# Patient Record
Sex: FEMALE | Race: OTHER | HISPANIC OR LATINO | URBAN - METROPOLITAN AREA
[De-identification: names, ages, dates, MRNs, and addresses within clinical notes are randomized per-mention and may not be internally consistent; named-entity substitution may affect disease eponyms.]

---

## 2019-07-24 ENCOUNTER — EMERGENCY (EMERGENCY)
Facility: HOSPITAL | Age: 39
LOS: 1 days | Discharge: HOME | End: 2019-07-24
Attending: EMERGENCY MEDICINE
Payer: MEDICAID

## 2019-07-24 VITALS
OXYGEN SATURATION: 100 % | RESPIRATION RATE: 16 BRPM | TEMPERATURE: 98 F | SYSTOLIC BLOOD PRESSURE: 108 MMHG | DIASTOLIC BLOOD PRESSURE: 74 MMHG | HEART RATE: 72 BPM

## 2019-07-24 VITALS
WEIGHT: 139.99 LBS | TEMPERATURE: 98 F | RESPIRATION RATE: 18 BRPM | OXYGEN SATURATION: 100 % | DIASTOLIC BLOOD PRESSURE: 73 MMHG | HEIGHT: 63 IN | SYSTOLIC BLOOD PRESSURE: 113 MMHG | HEART RATE: 76 BPM

## 2019-07-24 LAB
ALBUMIN SERPL ELPH-MCNC: 4.1 G/DL — SIGNIFICANT CHANGE UP (ref 3.3–5)
ALP SERPL-CCNC: 56 U/L — SIGNIFICANT CHANGE UP (ref 40–120)
ALT FLD-CCNC: 16 U/L — SIGNIFICANT CHANGE UP (ref 10–45)
ANION GAP SERPL CALC-SCNC: 13 MMOL/L — SIGNIFICANT CHANGE UP (ref 5–17)
AST SERPL-CCNC: 12 U/L — SIGNIFICANT CHANGE UP (ref 10–40)
BASOPHILS # BLD AUTO: 0 K/UL — SIGNIFICANT CHANGE UP (ref 0–0.2)
BASOPHILS NFR BLD AUTO: 0.4 % — SIGNIFICANT CHANGE UP (ref 0–2)
BILIRUB SERPL-MCNC: 0.4 MG/DL — SIGNIFICANT CHANGE UP (ref 0.2–1.2)
BUN SERPL-MCNC: 7 MG/DL — SIGNIFICANT CHANGE UP (ref 7–23)
CALCIUM SERPL-MCNC: 9.2 MG/DL — SIGNIFICANT CHANGE UP (ref 8.4–10.5)
CHLORIDE SERPL-SCNC: 101 MMOL/L — SIGNIFICANT CHANGE UP (ref 96–108)
CO2 SERPL-SCNC: 24 MMOL/L — SIGNIFICANT CHANGE UP (ref 22–31)
CREAT SERPL-MCNC: 0.57 MG/DL — SIGNIFICANT CHANGE UP (ref 0.5–1.3)
EOSINOPHIL # BLD AUTO: 0.1 K/UL — SIGNIFICANT CHANGE UP (ref 0–0.5)
EOSINOPHIL NFR BLD AUTO: 1.5 % — SIGNIFICANT CHANGE UP (ref 0–6)
GLUCOSE SERPL-MCNC: 200 MG/DL — HIGH (ref 70–99)
HCT VFR BLD CALC: 38.6 % — SIGNIFICANT CHANGE UP (ref 34.5–45)
HGB BLD-MCNC: 14.3 G/DL — SIGNIFICANT CHANGE UP (ref 11.5–15.5)
LYMPHOCYTES # BLD AUTO: 3.4 K/UL — HIGH (ref 1–3.3)
LYMPHOCYTES # BLD AUTO: 39.1 % — SIGNIFICANT CHANGE UP (ref 13–44)
MCHC RBC-ENTMCNC: 33.8 PG — SIGNIFICANT CHANGE UP (ref 27–34)
MCHC RBC-ENTMCNC: 37 GM/DL — HIGH (ref 32–36)
MCV RBC AUTO: 91.4 FL — SIGNIFICANT CHANGE UP (ref 80–100)
MONOCYTES # BLD AUTO: 0.5 K/UL — SIGNIFICANT CHANGE UP (ref 0–0.9)
MONOCYTES NFR BLD AUTO: 5.6 % — SIGNIFICANT CHANGE UP (ref 2–14)
NEUTROPHILS # BLD AUTO: 4.7 K/UL — SIGNIFICANT CHANGE UP (ref 1.8–7.4)
NEUTROPHILS NFR BLD AUTO: 53.5 % — SIGNIFICANT CHANGE UP (ref 43–77)
PLATELET # BLD AUTO: 314 K/UL — SIGNIFICANT CHANGE UP (ref 150–400)
POTASSIUM SERPL-MCNC: 3.8 MMOL/L — SIGNIFICANT CHANGE UP (ref 3.5–5.3)
POTASSIUM SERPL-SCNC: 3.8 MMOL/L — SIGNIFICANT CHANGE UP (ref 3.5–5.3)
PROT SERPL-MCNC: 6.8 G/DL — SIGNIFICANT CHANGE UP (ref 6–8.3)
RBC # BLD: 4.22 M/UL — SIGNIFICANT CHANGE UP (ref 3.8–5.2)
RBC # FLD: 10.8 % — SIGNIFICANT CHANGE UP (ref 10.3–14.5)
SODIUM SERPL-SCNC: 138 MMOL/L — SIGNIFICANT CHANGE UP (ref 135–145)
WBC # BLD: 8.7 K/UL — SIGNIFICANT CHANGE UP (ref 3.8–10.5)
WBC # FLD AUTO: 8.7 K/UL — SIGNIFICANT CHANGE UP (ref 3.8–10.5)

## 2019-07-24 PROCEDURE — 93005 ELECTROCARDIOGRAM TRACING: CPT

## 2019-07-24 PROCEDURE — 93010 ELECTROCARDIOGRAM REPORT: CPT

## 2019-07-24 PROCEDURE — 99284 EMERGENCY DEPT VISIT MOD MDM: CPT | Mod: 25

## 2019-07-24 PROCEDURE — 85027 COMPLETE CBC AUTOMATED: CPT

## 2019-07-24 PROCEDURE — 99283 EMERGENCY DEPT VISIT LOW MDM: CPT

## 2019-07-24 PROCEDURE — 80053 COMPREHEN METABOLIC PANEL: CPT

## 2019-07-24 NOTE — ED ADULT NURSE NOTE - NSIMPLEMENTINTERV_GEN_ALL_ED
Implemented All Universal Safety Interventions:  [place] to call system. Call bell, personal items and telephone within reach. Instruct patient to call for assistance. Room bathroom lighting operational. Non-slip footwear when patient is off stretcher. Physically safe environment: no spills, clutter or unnecessary equipment. Stretcher in lowest position, wheels locked, appropriate side rails in place.

## 2019-07-24 NOTE — ED PROVIDER NOTE - PHYSICAL EXAMINATION
GENERAL: no acute distress; well-developed  HEAD:  Atraumatic, Normocephalic  EYES: EOMI, PERRLA, conjunctiva and sclera clear  ENT: MMM; oropharynx clear  NECK: Supple, No JVD  CHEST/LUNG: Clear to auscultation bilaterally; No wheeze  HEART: Regular rate and rhythm; No murmurs, rubs, or gallops  ABDOMEN: Soft, Nontender, Nondistended; Bowel sounds present  EXTREMITIES:  2+ Peripheral Pulses, No clubbing, cyanosis, or edema  PSYCH: AAOx3  NEUROLOGY: no focal motor or sensory deficits. 5/5 muscle strength in all extremities. CN II-XII grossly intact. Negative Pronator Drift. Normal finger to nose. Normal Gait.  SKIN: No rashes or lesions

## 2019-07-24 NOTE — ED ADULT NURSE NOTE - OBJECTIVE STATEMENT
Patient is a 39 year old female complaining of dizziness. Arrived by walk-in. Patient has no pmhx. Patient is A&O x 4 and appears well. Pt reports she was visiting her  who is post op and felt warm, dizzy and nauseated. States she has not eaten or drank much today. Denies complaints of chest pain, sob, fevers, chills, v/d, headache, syncope, burning urination, blood in urine, blood in stool. Abd is soft, non tender, non distended. Skin is warm and dry. Color is consistent with ethnicity. VSS/ NAD. Safety and comfort maintained. Family at the bedside. Will continue to monitor.

## 2019-07-24 NOTE — ED PROVIDER NOTE - CLINICAL SUMMARY MEDICAL DECISION MAKING FREE TEXT BOX
38 y/o F no sig PMH presents with dizziness.  Likely stress reaction; no focal neurological deficits, palpitations or syncope.  Check EKG, r/o electrolyte abn, IVF, reassess. 38 y/o F no sig PMH presents with dizziness.  Likely stress reaction; no focal neurological deficits, palpitations or syncope.  Check EKG, r/o electrolyte abn, anemia, check pregnancy screen, IVF, reassess. 40 y/o F no sig PMH presents with dizziness/prensyncopal episode.   Likely stress reaction; no focal neurological deficits, palpitations or syncope.  Check EKG, r/o electrolyte abn, anemia, check pregnancy screen, IVF, reassess. Dr. Modi (Attending Physician)  40 y/o F no sig PMH presents with dizziness/prensyncopal episode.   Likely stress reaction; no focal neurological deficits, palpitations or syncope.  Check EKG, r/o electrolyte abn, anemia, check pregnancy screen, IVF, reassess.

## 2019-07-24 NOTE — ED PROVIDER NOTE - OBJECTIVE STATEMENT
38 y/o F no sig PMH presents with dizziness.    Patient was visiting her  who just underwent neurosurgery and upon seeing him, felt woozy with nausea. No syncope. No vomiting, headache, vision changes or palpitations. Symptoms improved in ED. No fevers/chills, melena, hematochezia or menorrhagia. 38 y/o F no sig PMH presents with dizziness.    Patient was visiting her  who just underwent neurosurgery and upon seeing him, felt warm, and light-headed with nausea. No syncope. No vomiting, headache, vision changes or palpitations. Symptoms improved in ED. No fevers/chills, melena, hematochezia or menorrhagia.

## 2019-07-24 NOTE — ED PROVIDER NOTE - ATTENDING CONTRIBUTION TO CARE
Dr. Modi (Attending Physician)  I performed a history and physical exam of the patient and discussed their management with the resident. I reviewed the resident's note and agree with the documented findings and plan of care. My medical decision making and observations are found above.

## 2020-10-21 ENCOUNTER — EMERGENCY (EMERGENCY)
Facility: HOSPITAL | Age: 40
LOS: 1 days | Discharge: HOME | End: 2020-10-21
Attending: EMERGENCY MEDICINE
Payer: COMMERCIAL

## 2020-10-21 VITALS
SYSTOLIC BLOOD PRESSURE: 118 MMHG | DIASTOLIC BLOOD PRESSURE: 74 MMHG | HEART RATE: 96 BPM | RESPIRATION RATE: 18 BRPM | TEMPERATURE: 99 F | HEIGHT: 60 IN | WEIGHT: 149.91 LBS | OXYGEN SATURATION: 100 %

## 2020-10-21 LAB
ALBUMIN SERPL ELPH-MCNC: 4.1 G/DL — SIGNIFICANT CHANGE UP (ref 3.3–5.2)
ALP SERPL-CCNC: 51 U/L — SIGNIFICANT CHANGE UP (ref 40–120)
ALT FLD-CCNC: 15 U/L — SIGNIFICANT CHANGE UP
ANION GAP SERPL CALC-SCNC: 9 MMOL/L — SIGNIFICANT CHANGE UP (ref 5–17)
APPEARANCE UR: CLEAR — SIGNIFICANT CHANGE UP
AST SERPL-CCNC: 14 U/L — SIGNIFICANT CHANGE UP
BASOPHILS # BLD AUTO: 0.02 K/UL — SIGNIFICANT CHANGE UP (ref 0–0.2)
BASOPHILS NFR BLD AUTO: 0.3 % — SIGNIFICANT CHANGE UP (ref 0–2)
BILIRUB SERPL-MCNC: 0.3 MG/DL — LOW (ref 0.4–2)
BILIRUB UR-MCNC: NEGATIVE — SIGNIFICANT CHANGE UP
BUN SERPL-MCNC: 7 MG/DL — LOW (ref 8–20)
CALCIUM SERPL-MCNC: 8.9 MG/DL — SIGNIFICANT CHANGE UP (ref 8.6–10.2)
CHLORIDE SERPL-SCNC: 103 MMOL/L — SIGNIFICANT CHANGE UP (ref 98–107)
CO2 SERPL-SCNC: 26 MMOL/L — SIGNIFICANT CHANGE UP (ref 22–29)
COLOR SPEC: YELLOW — SIGNIFICANT CHANGE UP
CREAT SERPL-MCNC: 0.55 MG/DL — SIGNIFICANT CHANGE UP (ref 0.5–1.3)
DIFF PNL FLD: ABNORMAL
EOSINOPHIL # BLD AUTO: 0.24 K/UL — SIGNIFICANT CHANGE UP (ref 0–0.5)
EOSINOPHIL NFR BLD AUTO: 3.5 % — SIGNIFICANT CHANGE UP (ref 0–6)
EPI CELLS # UR: SIGNIFICANT CHANGE UP
GLUCOSE SERPL-MCNC: 130 MG/DL — HIGH (ref 70–99)
GLUCOSE UR QL: NEGATIVE MG/DL — SIGNIFICANT CHANGE UP
HCT VFR BLD CALC: 38.8 % — SIGNIFICANT CHANGE UP (ref 34.5–45)
HGB BLD-MCNC: 13.3 G/DL — SIGNIFICANT CHANGE UP (ref 11.5–15.5)
IMM GRANULOCYTES NFR BLD AUTO: 0.1 % — SIGNIFICANT CHANGE UP (ref 0–1.5)
KETONES UR-MCNC: NEGATIVE — SIGNIFICANT CHANGE UP
LEUKOCYTE ESTERASE UR-ACNC: NEGATIVE — SIGNIFICANT CHANGE UP
LIDOCAIN IGE QN: 23 U/L — SIGNIFICANT CHANGE UP (ref 22–51)
LYMPHOCYTES # BLD AUTO: 2.56 K/UL — SIGNIFICANT CHANGE UP (ref 1–3.3)
LYMPHOCYTES # BLD AUTO: 36.9 % — SIGNIFICANT CHANGE UP (ref 13–44)
MCHC RBC-ENTMCNC: 31.6 PG — SIGNIFICANT CHANGE UP (ref 27–34)
MCHC RBC-ENTMCNC: 34.3 GM/DL — SIGNIFICANT CHANGE UP (ref 32–36)
MCV RBC AUTO: 92.2 FL — SIGNIFICANT CHANGE UP (ref 80–100)
MONOCYTES # BLD AUTO: 0.36 K/UL — SIGNIFICANT CHANGE UP (ref 0–0.9)
MONOCYTES NFR BLD AUTO: 5.2 % — SIGNIFICANT CHANGE UP (ref 2–14)
NEUTROPHILS # BLD AUTO: 3.74 K/UL — SIGNIFICANT CHANGE UP (ref 1.8–7.4)
NEUTROPHILS NFR BLD AUTO: 54 % — SIGNIFICANT CHANGE UP (ref 43–77)
NITRITE UR-MCNC: NEGATIVE — SIGNIFICANT CHANGE UP
PH UR: 7 — SIGNIFICANT CHANGE UP (ref 5–8)
PLATELET # BLD AUTO: 295 K/UL — SIGNIFICANT CHANGE UP (ref 150–400)
POTASSIUM SERPL-MCNC: 4.1 MMOL/L — SIGNIFICANT CHANGE UP (ref 3.5–5.3)
POTASSIUM SERPL-SCNC: 4.1 MMOL/L — SIGNIFICANT CHANGE UP (ref 3.5–5.3)
PROT SERPL-MCNC: 6.7 G/DL — SIGNIFICANT CHANGE UP (ref 6.6–8.7)
PROT UR-MCNC: NEGATIVE MG/DL — SIGNIFICANT CHANGE UP
RBC # BLD: 4.21 M/UL — SIGNIFICANT CHANGE UP (ref 3.8–5.2)
RBC # FLD: 11.5 % — SIGNIFICANT CHANGE UP (ref 10.3–14.5)
RBC CASTS # UR COMP ASSIST: SIGNIFICANT CHANGE UP /HPF (ref 0–4)
SODIUM SERPL-SCNC: 138 MMOL/L — SIGNIFICANT CHANGE UP (ref 135–145)
SP GR SPEC: 1.01 — SIGNIFICANT CHANGE UP (ref 1.01–1.02)
UROBILINOGEN FLD QL: NEGATIVE MG/DL — SIGNIFICANT CHANGE UP
WBC # BLD: 6.93 K/UL — SIGNIFICANT CHANGE UP (ref 3.8–10.5)
WBC # FLD AUTO: 6.93 K/UL — SIGNIFICANT CHANGE UP (ref 3.8–10.5)
WBC UR QL: NEGATIVE — SIGNIFICANT CHANGE UP

## 2020-10-21 PROCEDURE — 80053 COMPREHEN METABOLIC PANEL: CPT

## 2020-10-21 PROCEDURE — 96374 THER/PROPH/DIAG INJ IV PUSH: CPT

## 2020-10-21 PROCEDURE — 36415 COLL VENOUS BLD VENIPUNCTURE: CPT

## 2020-10-21 PROCEDURE — 84702 CHORIONIC GONADOTROPIN TEST: CPT

## 2020-10-21 PROCEDURE — 87086 URINE CULTURE/COLONY COUNT: CPT

## 2020-10-21 PROCEDURE — 74176 CT ABD & PELVIS W/O CONTRAST: CPT | Mod: 26

## 2020-10-21 PROCEDURE — 99284 EMERGENCY DEPT VISIT MOD MDM: CPT | Mod: 25

## 2020-10-21 PROCEDURE — 99285 EMERGENCY DEPT VISIT HI MDM: CPT

## 2020-10-21 PROCEDURE — 74176 CT ABD & PELVIS W/O CONTRAST: CPT

## 2020-10-21 PROCEDURE — 85025 COMPLETE CBC W/AUTO DIFF WBC: CPT

## 2020-10-21 PROCEDURE — 83690 ASSAY OF LIPASE: CPT

## 2020-10-21 PROCEDURE — 81001 URINALYSIS AUTO W/SCOPE: CPT

## 2020-10-21 RX ORDER — KETOROLAC TROMETHAMINE 30 MG/ML
30 SYRINGE (ML) INJECTION ONCE
Refills: 0 | Status: DISCONTINUED | OUTPATIENT
Start: 2020-10-21 | End: 2020-10-21

## 2020-10-21 RX ADMIN — Medication 30 MILLIGRAM(S): at 18:24

## 2020-10-21 NOTE — ED STATDOCS - OBJECTIVE STATEMENT
41 y/o F pt with no significant PMHx presents to the ED c/o constant severe abd pain that suddenly began about 7 hours ago. States pain radiates to her back. Denies dysuria, fevers, n/v/d, vaginal bleeding, discharge. Had this pain a month ago but it resolved quickly. NKDA. No further complaints at this time.

## 2020-10-21 NOTE — ED ADULT TRIAGE NOTE - CHIEF COMPLAINT QUOTE
pt c/o lower abd pain that began this morning that radiates to lower back. pt denies any difficulty urinating or pain with urination

## 2020-10-21 NOTE — ED STATDOCS - ATTENDING CONTRIBUTION TO CARE
I, Jason Pierre, performed the initial face to face bedside interview with this patient regarding history of present illness, review of symptoms and relevant past medical, social and family history.  I completed an independent physical examination.  I was the initial provider who evaluated this patient. I have signed out the follow up of any pending tests (i.e. labs, radiological studies) to the ACP.  I have communicated the patient’s plan of care and disposition with the ACP.

## 2020-10-21 NOTE — ED STATDOCS - NSFOLLOWUPINSTRUCTIONS_ED_ALL_ED_FT
Follow up with PMD.  Come back with new or worsening symptoms.    Abdominal Pain    Many things can cause abdominal pain. Many times, abdominal pain is not caused by a disease and will improve without treatment. Your health care provider will do a physical exam to determine if there is a dangerous cause of your pain; blood tests and imaging may help determine the cause of your pain. However, in many cases, no cause may be found and you may need further testing as an outpatient. Monitor your abdominal pain for any changes.     SEEK IMMEDIATE MEDICAL CARE IF YOU HAVE ANY OF THE FOLLOWING SYMPTOMS: worsening abdominal pain, uncontrollable vomiting, profuse diarrhea, inability to have bowel movements or pass gas, black or bloody stools, fever accompanying chest pain or back pain, or fainting. These symptoms may represent a serious problem that is an emergency. Do not wait to see if the symptoms will go away. Get medical help right away. Call 911 and do not drive yourself to the hospital.

## 2020-10-21 NOTE — ED STATDOCS - NS ED ROS FT
Review of Systems  •	CONSTITUTIONAL - no  fever, no diaphoresis, no weight change  •	SKIN - no rash  •	HEMATOLOGIC - no bleeding, no bruising  •	EYES - no eye pain, no blurred vision  •	ENT - no change in hearing, no pain  •	RESPIRATORY - no shortness of breath, no cough  •	CARDIAC - no chest pain, no palpitations  •	GI - (+) abd pain, no nausea, no vomiting, no diarrhea, no constipation, no bleeding  •	GENITO-URINARY - no discharge, no dysuria; no hematuria,   •	ENDO - no polydipsia, no polyuria, no heat/no cold intolerance  •	MUSCULOSKELETAL - no joint pain, no swelling, no redness, (+) back pain  •	NEUROLOGIC - no weakness, no headache, no anesthesia, no paresthesias  •	PSYCH - no anxiety, non suicidal, non homicidal, no hallucination, no depression

## 2020-10-21 NOTE — ED STATDOCS - PATIENT PORTAL LINK FT
You can access the FollowMyHealth Patient Portal offered by [facility] by registering at the following website: [email or web address]. By joining [facility]’s FollowMyHealth portal, you will also be able to view your health information using other applications (apps) compatible with our system.

## 2020-10-21 NOTE — ED STATDOCS - PHYSICAL EXAMINATION
VITAL SIGNS: I have reviewed nursing notes and confirm.  CONSTITUTIONAL: Well-developed; well-nourished; in no acute distress.  SKIN: Skin exam is warm and dry, no acute rash.  HEAD: Normocephalic; atraumatic.  EYES: PERRL, EOM intact; conjunctiva and sclera clear.  ENT: No nasal discharge; airway clear. Throat clear.  NECK: Supple; non tender.    CARD: S1, S2 normal; Regular rate and rhythm.  RESP: No wheezes,  no rales or rhonchi.   ABD:  soft; non-distended; suprapubic tenderness  EXT: Bilateral lower back pain. Normal ROM. No clubbing, cyanosis or edema.  NEURO: Alert, oriented. Grossly unremarkable. No focal deficits. no facial droop, moves all extremities,  normal gait   PSYCH: Cooperative, appropriate.

## 2020-10-21 NOTE — ED STATDOCS - CLINICAL SUMMARY MEDICAL DECISION MAKING FREE TEXT BOX
Pt with sudden onset of suprapubic pain that radiates to her back. Will obtain blood work, check urine, and CT renal.

## 2020-10-21 NOTE — ED STATDOCS - PROGRESS NOTE DETAILS
PT evaluated by intake physician. HPI/PE/ROS as noted above. Will follow up plan per intake physician. Pt with lower abd pain radiating to b/l flanks. Pt states pain is increased with movement. She denies any trauma/hematuria/dysuria/frequency. Will await labs/urine and CT. Labs reviewed and all incidental findings discussed with pt. Will dc with f/u. Pt given return precautions. Will dc.

## 2020-10-22 LAB
CULTURE RESULTS: SIGNIFICANT CHANGE UP
SPECIMEN SOURCE: SIGNIFICANT CHANGE UP

## 2020-12-14 ENCOUNTER — EMERGENCY (EMERGENCY)
Facility: HOSPITAL | Age: 40
LOS: 1 days | Discharge: HOME | End: 2020-12-14
Attending: EMERGENCY MEDICINE
Payer: COMMERCIAL

## 2020-12-14 VITALS
OXYGEN SATURATION: 100 % | DIASTOLIC BLOOD PRESSURE: 75 MMHG | HEIGHT: 60 IN | WEIGHT: 149.91 LBS | SYSTOLIC BLOOD PRESSURE: 116 MMHG | TEMPERATURE: 98 F | HEART RATE: 94 BPM | RESPIRATION RATE: 18 BRPM

## 2020-12-14 LAB
ALBUMIN SERPL ELPH-MCNC: 4.4 G/DL — SIGNIFICANT CHANGE UP (ref 3.3–5.2)
ALP SERPL-CCNC: 52 U/L — SIGNIFICANT CHANGE UP (ref 40–120)
ALT FLD-CCNC: 14 U/L — SIGNIFICANT CHANGE UP
ANION GAP SERPL CALC-SCNC: 8 MMOL/L — SIGNIFICANT CHANGE UP (ref 5–17)
APPEARANCE UR: ABNORMAL
APTT BLD: 29.9 SEC — SIGNIFICANT CHANGE UP (ref 27.5–35.5)
AST SERPL-CCNC: 15 U/L — SIGNIFICANT CHANGE UP
BACTERIA # UR AUTO: ABNORMAL
BASOPHILS # BLD AUTO: 0.02 K/UL — SIGNIFICANT CHANGE UP (ref 0–0.2)
BASOPHILS NFR BLD AUTO: 0.3 % — SIGNIFICANT CHANGE UP (ref 0–2)
BILIRUB SERPL-MCNC: 0.3 MG/DL — LOW (ref 0.4–2)
BILIRUB UR-MCNC: NEGATIVE — SIGNIFICANT CHANGE UP
BLD GP AB SCN SERPL QL: SIGNIFICANT CHANGE UP
BUN SERPL-MCNC: 11 MG/DL — SIGNIFICANT CHANGE UP (ref 8–20)
CALCIUM SERPL-MCNC: 9.3 MG/DL — SIGNIFICANT CHANGE UP (ref 8.6–10.2)
CHLORIDE SERPL-SCNC: 103 MMOL/L — SIGNIFICANT CHANGE UP (ref 98–107)
CO2 SERPL-SCNC: 23 MMOL/L — SIGNIFICANT CHANGE UP (ref 22–29)
COLOR SPEC: YELLOW — SIGNIFICANT CHANGE UP
CREAT SERPL-MCNC: 0.51 MG/DL — SIGNIFICANT CHANGE UP (ref 0.5–1.3)
DIFF PNL FLD: ABNORMAL
EOSINOPHIL # BLD AUTO: 0.13 K/UL — SIGNIFICANT CHANGE UP (ref 0–0.5)
EOSINOPHIL NFR BLD AUTO: 2 % — SIGNIFICANT CHANGE UP (ref 0–6)
EPI CELLS # UR: ABNORMAL
GLUCOSE SERPL-MCNC: 94 MG/DL — SIGNIFICANT CHANGE UP (ref 70–99)
GLUCOSE UR QL: 100 MG/DL
HCG SERPL-ACNC: HIGH MIU/ML
HCT VFR BLD CALC: 38.8 % — SIGNIFICANT CHANGE UP (ref 34.5–45)
HGB BLD-MCNC: 13.4 G/DL — SIGNIFICANT CHANGE UP (ref 11.5–15.5)
IMM GRANULOCYTES NFR BLD AUTO: 0.3 % — SIGNIFICANT CHANGE UP (ref 0–1.5)
INR BLD: 1.11 RATIO — SIGNIFICANT CHANGE UP (ref 0.88–1.16)
KETONES UR-MCNC: NEGATIVE — SIGNIFICANT CHANGE UP
LEUKOCYTE ESTERASE UR-ACNC: ABNORMAL
LYMPHOCYTES # BLD AUTO: 2.46 K/UL — SIGNIFICANT CHANGE UP (ref 1–3.3)
LYMPHOCYTES # BLD AUTO: 38.3 % — SIGNIFICANT CHANGE UP (ref 13–44)
MCHC RBC-ENTMCNC: 31.6 PG — SIGNIFICANT CHANGE UP (ref 27–34)
MCHC RBC-ENTMCNC: 34.5 GM/DL — SIGNIFICANT CHANGE UP (ref 32–36)
MCV RBC AUTO: 91.5 FL — SIGNIFICANT CHANGE UP (ref 80–100)
MONOCYTES # BLD AUTO: 0.51 K/UL — SIGNIFICANT CHANGE UP (ref 0–0.9)
MONOCYTES NFR BLD AUTO: 7.9 % — SIGNIFICANT CHANGE UP (ref 2–14)
NEUTROPHILS # BLD AUTO: 3.29 K/UL — SIGNIFICANT CHANGE UP (ref 1.8–7.4)
NEUTROPHILS NFR BLD AUTO: 51.2 % — SIGNIFICANT CHANGE UP (ref 43–77)
NITRITE UR-MCNC: NEGATIVE — SIGNIFICANT CHANGE UP
PH UR: 6 — SIGNIFICANT CHANGE UP (ref 5–8)
PLATELET # BLD AUTO: 295 K/UL — SIGNIFICANT CHANGE UP (ref 150–400)
POTASSIUM SERPL-MCNC: 4.2 MMOL/L — SIGNIFICANT CHANGE UP (ref 3.5–5.3)
POTASSIUM SERPL-SCNC: 4.2 MMOL/L — SIGNIFICANT CHANGE UP (ref 3.5–5.3)
PROT SERPL-MCNC: 7.4 G/DL — SIGNIFICANT CHANGE UP (ref 6.6–8.7)
PROT UR-MCNC: 30 MG/DL
PROTHROM AB SERPL-ACNC: 12.8 SEC — SIGNIFICANT CHANGE UP (ref 10.6–13.6)
RBC # BLD: 4.24 M/UL — SIGNIFICANT CHANGE UP (ref 3.8–5.2)
RBC # FLD: 11.8 % — SIGNIFICANT CHANGE UP (ref 10.3–14.5)
RBC CASTS # UR COMP ASSIST: SIGNIFICANT CHANGE UP /HPF (ref 0–4)
SODIUM SERPL-SCNC: 134 MMOL/L — LOW (ref 135–145)
SP GR SPEC: 1.03 — HIGH (ref 1.01–1.02)
UROBILINOGEN FLD QL: 1 MG/DL
WBC # BLD: 6.43 K/UL — SIGNIFICANT CHANGE UP (ref 3.8–10.5)
WBC # FLD AUTO: 6.43 K/UL — SIGNIFICANT CHANGE UP (ref 3.8–10.5)
WBC UR QL: ABNORMAL

## 2020-12-14 PROCEDURE — 86850 RBC ANTIBODY SCREEN: CPT

## 2020-12-14 PROCEDURE — 76801 OB US < 14 WKS SINGLE FETUS: CPT | Mod: 26

## 2020-12-14 PROCEDURE — 93005 ELECTROCARDIOGRAM TRACING: CPT

## 2020-12-14 PROCEDURE — 76817 TRANSVAGINAL US OBSTETRIC: CPT

## 2020-12-14 PROCEDURE — 81001 URINALYSIS AUTO W/SCOPE: CPT

## 2020-12-14 PROCEDURE — 87086 URINE CULTURE/COLONY COUNT: CPT

## 2020-12-14 PROCEDURE — 85610 PROTHROMBIN TIME: CPT

## 2020-12-14 PROCEDURE — 99284 EMERGENCY DEPT VISIT MOD MDM: CPT

## 2020-12-14 PROCEDURE — 85025 COMPLETE CBC W/AUTO DIFF WBC: CPT

## 2020-12-14 PROCEDURE — 76801 OB US < 14 WKS SINGLE FETUS: CPT

## 2020-12-14 PROCEDURE — 76817 TRANSVAGINAL US OBSTETRIC: CPT | Mod: 26

## 2020-12-14 PROCEDURE — 93010 ELECTROCARDIOGRAM REPORT: CPT

## 2020-12-14 PROCEDURE — 36415 COLL VENOUS BLD VENIPUNCTURE: CPT

## 2020-12-14 PROCEDURE — T1013: CPT

## 2020-12-14 PROCEDURE — 84702 CHORIONIC GONADOTROPIN TEST: CPT

## 2020-12-14 PROCEDURE — 85730 THROMBOPLASTIN TIME PARTIAL: CPT

## 2020-12-14 PROCEDURE — 99285 EMERGENCY DEPT VISIT HI MDM: CPT

## 2020-12-14 PROCEDURE — 86901 BLOOD TYPING SEROLOGIC RH(D): CPT

## 2020-12-14 PROCEDURE — 80053 COMPREHEN METABOLIC PANEL: CPT

## 2020-12-14 PROCEDURE — 86900 BLOOD TYPING SEROLOGIC ABO: CPT

## 2020-12-14 RX ORDER — CEPHALEXIN 500 MG
1 CAPSULE ORAL
Qty: 14 | Refills: 0
Start: 2020-12-14 | End: 2020-12-20

## 2020-12-14 RX ORDER — CEPHALEXIN 500 MG
500 CAPSULE ORAL ONCE
Refills: 0 | Status: COMPLETED | OUTPATIENT
Start: 2020-12-14 | End: 2020-12-14

## 2020-12-14 RX ADMIN — Medication 500 MILLIGRAM(S): at 20:48

## 2020-12-14 NOTE — CONSULT NOTE ADULT - ASSESSMENT
41yo  LMP  at 5.1 weeks GA who is being evaluated for suprapubic pain.   1. Suprapubic pain, likely 2/2 UTI   -VSS  -PE benign   -UA positive  -TVUS showing small intrauterine gestational sac with yolk sa 39yo  LMP  at 5.1 weeks GA who is being evaluated for suprapubic pain.   1. Suprapubic pain, likely 2/2 UTI   -VSS  -PE benign   -UA positive  -HCG 11,565  -TVUS showing small intrauterine gestational sac with yolk sac, no adnexal structures or free fluid noted   -No medical or surgical management indicated at this time   Patient with suprapubic pain likely 2/2 a UTI. Given beta HCG level and TVUS findings, patient likely with early IUP, however, ectopic pregnancy or missed  cannot be ruled out at this time. Patient to have a follow up visit with a repeat TVUS and HCG trending to determine location of pregnancy and discuss options for termination as this is what patient desires.     Dispo: Patient is stable for discharge to home with outpatient follow up within 48 hours. Differential diagnoses discussed with the patient. Return precautions given. Patient verbalized understanding and agreement with plan.     Discussed with Dr. Mauricio.     Alphonse Cohen MD  [address] [place] [address] [address]  [place] [address]  [phone]

## 2020-12-14 NOTE — ED STATDOCS - CLINICAL SUMMARY MEDICAL DECISION MAKING FREE TEXT BOX
39yo F  at ~5 weeks gestation p/w lower abd pain, hasn't had US confirming IUP yet- labs, TVUS, UA, reassess. Cheryle Snow DO

## 2020-12-14 NOTE — ED STATDOCS - PROGRESS NOTE DETAILS
KVNG Anand NOTE: Pt seen by GYN team who recommends follow up in 2 days with Dr. Bravo KVNG Anand NOTE: Pt evaluated at bedside. Pt evaluated prior by intake physician. Otherwise HPI/PE/ROS as noted above. Will follow up plan per intake physician.   Reviewed all results and plan with Dr. Snow, victoria soft NTND no guarding.  Pt stable for d/c, reports improvement, VSS, tolerating PO, ambulatory.  Discussion includes results, plan, proper medication use/side effects, and return precautions. Pt advised to f/u with PMD 1-2 days and specialists discussed.  Pt given printed copies of lab/radiology for outpt follow up. Pt verbalized understanding/agreement of plan. [place]  Ravindra #[id number]

## 2020-12-14 NOTE — ED STATDOCS - PATIENT PORTAL LINK FT
You can access the FollowMyHealth Patient Portal offered by [facility] by registering at the following website: [email or web address]. By joining [facility]’s FollowMyHealth portal, you will also be able to view your health information using other applications (apps) compatible with our system.

## 2020-12-14 NOTE — ED STATDOCS - ATTENDING CONTRIBUTION TO CARE
I, Cheryle Snow, performed a face to face bedside interview with this patient regarding history of present illness, review of symptoms and relevant past medical, social and family history.  I completed an independent physical examination. Medical decision making, follow-up on ordered tests (ie labs, radiologic studies) and re-evaluation of the patient's status has been communicated to the ACP.  Disposition of the patient will be based on test outcome and response to ED interventions.

## 2020-12-14 NOTE — ED STATDOCS - NSFOLLOWUPINSTRUCTIONS_ED_ALL_ED_FT
- Please follow up with your Primary Care Doctor in 24-48 hr.  - Seek immediate medical care for any new, worsening or concerning signs or symptoms.   - Take medications as directed, be sure to read all instructions on packaging  - You were given copies of all your test results, please bring to your primary care doctor for review of any abnormal test results  - Follow up with your Gynecologist this Wednesday Dec 16th 2020 for repeat labs and ultrasound    Feel better!       Dolor abdominal flex el embarazo    LO QUE NECESITA SABER:    El dolor abdominal flex el embarazo es común. Algunas de las causas incluyen la acidez estomacal, estreñimiento, gases, falso labor de parto, y el dolor del ligamento jonathan. El dolor del ligamento jonathan es causado por el estiramiento de los ligamentos que sostienen el útero. El dolor abdominal puede ser causado por un problema de saba, adalberto virus estomacal o apendicitis (inflamación del apéndice). El dolor puede ser provocado por un problema con johnston embarazo, adalberto kelly amenaza de parto prematuro o aborto espontáneo.    INSTRUCCIONES SOBRE EL SILVANA HOSPITALARIA:    Caden kelly lisa de seguimiento con johnston obstetra dentro de los jeannie 3 días posteriores al silvana:Anote ruma preguntas para que se acuerde de hacerlas flex ruma visitas.    Cuidados personales:  •El reposo puede ayudar a aliviar el dolor del ligamento jonathan. Consulte a johnston médico acerca de otras maneras de aliviar corina dolor, adalberto usar un cinturón o borges de soporte o hacer ejercicios aptos para el embarazo.      •Utilice kelly almohadilla térmica en la temperatura más baja o kelly compresa caliente para que se la coloque en johnston abdomen. Caden esto por 20 a 30 minutos cada 2 horas por tantos días adalberto le indiquen.      •Evite cambios de posición repentinos o movimientos que causan dolor.      •No se recueste en la cama ni se incline hacia laly si tiene acidez estomacal. Pregúntele a johnston ginecólogo si usted debería hacer cambios a johnston alimentación. Pregunte si usted puede lewis algún medicamento para la acidez estomacal.      •Consuma alimentos con altos contenido de fibra y jahaira más líquidos para aliviar el estreñimiento. La fibra se encuentra en frutas, verduras, y alimentos de grano integral, adalberto el pan y el cereal integral. Pregunte cuánto líquido debe lewis cada día y cuáles líquidos son los más adecuados para usted.      Comuníquese con johnston obstetra si:  •El dolor abdominal persiste y no se puede aliviar.      •Tiene fiebre.      •Usted tiene preguntas o inquietudes acerca de johnston condición o cuidado.      Regrese a la jonelle de emergencias si:  •Usted de repente tiene un intenso dolor o cólicos que son tan dar que le impiden caminar o hablar.      •Usted tiene un latido cardíaco rápido, le falta el aire y se siente mareado o que se va a desmayar.      •Usted tiene sangrado o secreción vaginal.      •Usted tiene náuseas, vomito, fiebre y un dolor intenso en el lado derecho de johnston cuerpo.      Infección del tracto urinario en embarazo    LO QUE NECESITA SABER:    Kelly infección en el tracto urinario (ITU) ocurre cuando entran bacterias en johnston tracto urinario. El tracto urinario incluye ruma riñones y vejiga. Las infecciones del tracto urinario son comunes en mujeres, sobre todo flex el embarazo. La razón es por los cambios en johnston sistema inmunológico, hormonas y útero. A medida que johnston útero crece, es probable que johnston vejiga no logre vaciarse por completo. Las bacterias pueden crecer en la orina que queda en johnston vejiga y causarle kelly infección. Las infecciones del tracto urinario flex el embarazo pueden aumentar johnston riesgo de sufrir de kelly infección de riñón y de tener un parto prematuro.    Aparato urinario femenino         INSTRUCCIONES SOBRE EL SILVANA HOSPITALARIA:    Regrese a la jonelle de emergencias si:  •Usted está orinando muy poco o nada en absoluto.      •Usted tiene dolor intenso.      •Usted tiene fiebre y escalofríos.      Llame a johnston médico u obstetra si:  •Usted tiene dolor a los lados de johnston espalda.      •Usted no se siente mejor después de 2 días de tratamiento.      •Usted está vomitando.      •Usted tiene preguntas o inquietudes acerca de johnston condición o cuidado.      Medicamentos:Es posible que usted necesite alguno de los siguientes:   •Los antibióticosayudan a combatir kelly infección bacterial.      •Los medicamentospara disminuir el dolor y el ardor al orinar. También ayudarán a disminuir la sensación de necesitar orinar con frecuencia. Estos medicamentos harán que orine de color anaranjado o cochran.      •[place] ruma medicamentos adalberto se le haya indicado.Consulte con johnston médico si usted janee que johnston medicamento no le está ayudando o si presenta efectos secundarios. Infórmele si es alérgico a cualquier medicamento. Mantenga kelly lista actualizada de los medicamentos, las vitaminas y los productos herbales que anjana. Incluya los siguientes datos de los medicamentos: cantidad, frecuencia y motivo de administración. Traiga con usted la lista o los envases de las píldoras a ruma citas de seguimiento. Lleve la lista de los medicamentos con usted en chino de kelly emergencia.      Evite otra ITU:  •Orine cuando sienta el impulso de hacerlo.No contenga la orina. Orine aquino pronto adalberto sea necesario. Siempre orine después de tener relaciones sexuales. [place] ayuda a eliminar las bacterias que pasan flex las relaciones sexuales.      •[place] líquidos adalberto se le haya indicado.Pregunte cuánto líquido debe lewis cada día y cuáles líquidos son los más adecuados para usted. Puede que sea necesario lewis más líquidos de lo usual para eliminar la bacteria de johnston tracto urinario. No tome bebidas con cafeína o gaseosas. Estas bebidas pueden irritar johnston vejiga. [place] médico puede recomendarle el jugo de arándano para prevenir kelly infección urinaria.      •Límpiese de adelante hacia atrás después de orinar o de tener kelly evacuación intestinal.[place] ayudará a evitar que los gérmenes entren en el tracto urinario a través de la uretra.      •Realice ejercicios para los músculos pélvicos con frecuencia.Los ejercicios para los músculos pélvicos ayudan a empezar y parar de orinar. Los músculos pélvicos dar ayudan a vaciar la vejiga más fácilmente. Apriete estos músculos firmemente flex 5 segundos adalberto si estuviera tratando de retener el flujo de orina. Luego relaje por 5 segundos. Aumente gradualmente a 10 segundos. Caden 3 series de 15 repeticiones al día o adalberto se le indique.      Acuda a ruma consultas de control con johnston médico u obstetra según le indicaron:Es probable que usted necesite regresar para que le realicen más análisis de orina. Anote ruma preguntas para que se acuerde de hacerlas flex ruma visitas.

## 2020-12-14 NOTE — CONSULT NOTE ADULT - SUBJECTIVE AND OBJECTIVE BOX
Patient is a 41yo  LMP  at 5.1 weeks GA who presents with suprapubic pain for 3 days duration. Suprapubic pain began 3 days ago, rated at a 3/10, no alleviating or exacerbating factors, associated with dysuria. She denies fevers, chills, nausea, vomiting, lightheadedness, dizziness, palpitations, shortness of breath and chest pain. No other complaints at this time. Of note, she states that she had a positive urine pregnancy test at home and she endorses this is an undesired pregnancy.     POB: Full term, uncomplicated  section x3 (, , )  PGYN: -fibroids, -cysts, denies STD hx, denies abnormal PAP smears   PMH: Denies  PSH: C/S x3 (, , )  SH: Denies EtOH, tobacco and illicit drug use; Feels safe at home   Meds: Denies  All: NKDA    Vital Signs Last 24 Hrs  T(C): 36.6 (14 Dec 2020 18:09), Max: 36.6 (14 Dec 2020 18:09)  T(F): 97.9 (14 Dec 2020 18:09), Max: 97.9 (14 Dec 2020 18:09)  HR: 94 (14 Dec 2020 18:09) (94 - 94)  BP: 116/75 (14 Dec 2020 18:09) (116/75 - 116/75)  RR: 18 (14 Dec 2020 18:09) (18 - 18)  SpO2: 100% (14 Dec 2020 18:09) (100% - 100%)    General: NAD, well-appearing  Heart: RRR  Lungs: CTAB  Abdominal: soft, minimally tender to palpation of the suprapubic region, non-distended, no rebound or guarding, +BS  Ext: non-tender, non-edematous                           13.4   6.43  )-----------( 295      ( 14 Dec 2020 19:15 )             38.8         134<L>  |  103  |  11.0  ----------------------------<  94  4.2   |  23.0  |  0.51    Ca    9.3      14 Dec 2020 19:15    TPro  7.4  /  Alb  4.4  /  TBili  0.3<L>  /  DBili  x   /  AST  15  /  ALT  14  /  AlkPhos  52  12-14    HCG Quantitative, Serum (20 @ 19:15)  HCG Quantitative, Serum: 63205.0    US OB <=14 Weeks, First Gestation (20 @ 20:22)   FINDINGS:  Uterus: 9.4 x 5 x 6.3 cm. Question left uterine fibroid measuring 1.7 x 1.8 x 1.6 cm.    Gestational Sac Size (mean): 1.1 cm  Gestations Sac Shape : Normal.  Crown Rump Length: n/a  Estimated Gestational Age: 5 weeks 1 day  Yolk Sac: Normal.  Fetal Heart Rate: Not detected.    Right ovary: 3.5 x 2.6 x 2.1 cm. 2.1 x 2.2 x 2.3 cm corpus luteum. Normal arterial and venous waveforms.  Left ovary: 2.2 x 1.4 x 1.8 cm. Within normal limits. Normal arterial and venous waveforms.    Fluid: None.    IMPRESSION:  Small intrauterine gestational sac, which may represent an early normal IUP (unknown viability), pregnancy failure or a pseudosac with ectopic pregnancy. Serial hCG and ultrasound are recommended to determine the significance of these findings.  Estimated gestational age of 5 weeks 1 day.  Estimated due date of  8/15/2021.

## 2020-12-14 NOTE — ED STATDOCS - CARE PROVIDER_API CALL
Alphonse Cohen)  Obstetrics and Gynecology  [address] [facility] [address]  [place] [address]  Phone: [phone]  Fax: [phone]  Follow Up Time: Urgent

## 2020-12-14 NOTE — ED STATDOCS - CARE PLAN
Principal Discharge DX:	Abdominal pain in pregnancy, first trimester  Secondary Diagnosis:	UTI (urinary tract infection)

## 2020-12-14 NOTE — ED STATDOCS - OBJECTIVE STATEMENT
41 y/o F with no PMHx  presents to ED c/o lower abdominal pain onset 3 days ago. Associated symptoms of nausea and burning with urination. Pt took a pregnancy test 2 days ago which came back positive and her last menstrual period was November 7th. This is the patient's 4th pregnancy. Denies: vaginal bleeding, D/V. 41 y/o F  LMP  with no PMHx  presents to ED c/o lower abdominal pain onset 3 days ago. Associated symptoms of nausea and burning with urination. Pt took a pregnancy test 2 days ago which came back positive and her last menstrual period was . This is the patient's 4th pregnancy. Denies: vaginal bleeding, D/V.

## 2020-12-14 NOTE — ED STATDOCS - PHYSICAL EXAMINATION
Gen: NAD, AOx3  Head: NCAT  Lung: CTAB, no respiratory distress, no wheezing, rales, rhonchi  CV: normal s1/s2, rrr, no murmurs, Normal perfusion, pulses 2+ throughout  Abd: soft, minimally tender suprapubic region no guarding/ridigity  MSK: No edema, no visible deformities, full range of motion in all 4 extremities  Neuro: No focal neurologic deficits  Skin: No rash   Psych: normal affect

## 2020-12-15 LAB
CULTURE RESULTS: SIGNIFICANT CHANGE UP
SPECIMEN SOURCE: SIGNIFICANT CHANGE UP

## 2020-12-16 PROBLEM — Z00.00 ENCOUNTER FOR PREVENTIVE HEALTH EXAMINATION: Status: ACTIVE | Noted: 2020-12-16

## 2021-01-06 ENCOUNTER — APPOINTMENT (OUTPATIENT)
Dept: OBGYN | Facility: CLINIC | Age: 41
End: 2021-01-06

## 2021-01-10 ENCOUNTER — EMERGENCY (EMERGENCY)
Facility: HOSPITAL | Age: 41
LOS: 1 days | Discharge: HOME | End: 2021-01-10
Attending: EMERGENCY MEDICINE
Payer: COMMERCIAL

## 2021-01-10 VITALS
OXYGEN SATURATION: 100 % | HEIGHT: 60 IN | TEMPERATURE: 99 F | SYSTOLIC BLOOD PRESSURE: 118 MMHG | DIASTOLIC BLOOD PRESSURE: 81 MMHG | RESPIRATION RATE: 18 BRPM | WEIGHT: 145.06 LBS | HEART RATE: 99 BPM

## 2021-01-10 PROCEDURE — U0005: CPT

## 2021-01-10 PROCEDURE — 99283 EMERGENCY DEPT VISIT LOW MDM: CPT

## 2021-01-10 PROCEDURE — U0003: CPT

## 2021-01-10 PROCEDURE — 99284 EMERGENCY DEPT VISIT MOD MDM: CPT

## 2021-01-10 RX ORDER — ACETAMINOPHEN 500 MG
650 TABLET ORAL ONCE
Refills: 0 | Status: COMPLETED | OUTPATIENT
Start: 2021-01-10 | End: 2021-01-10

## 2021-01-10 RX ADMIN — Medication 650 MILLIGRAM(S): at 20:27

## 2021-01-10 NOTE — ED PROVIDER NOTE - OBJECTIVE STATEMENT
40 year old male with no pmhx presents with c/o headache and sinus pressure. Pt states she had + covid contact from her , who is here at [facility] due to covid. She has been taking tylenol for pain relief which has been alleviating symptoms. She is requesting covid testing. Additionally, pt is upset about her living arrangements and would like to speak to someone. She states she lives in her brother in laws house, they are asking her to move out. Also she is pregnant and has a GYN appointment to end the pregnancy. denies fever/chills, n/v/d, loss of taste and smell. 40 year old female with no pmhx presents with c/o headache and sinus pressure. Pt states she had + covid contact from her , who is here at [facility] due to covid. She has been taking tylenol for pain relief which has been alleviating symptoms. She is requesting covid testing. Additionally, pt is upset about her living arrangements and would like to speak to someone. She states she lives in her brother in laws house, they are asking her to move out. Also she is pregnant and has a GYN appointment to end the pregnancy. denies fever/chills, n/v/d, loss of taste and smell.

## 2021-01-10 NOTE — ED ADULT TRIAGE NOTE - CHIEF COMPLAINT QUOTE
patient states that she has HA for 3 days with pain to eye, states + covid contacts took test and was negative 2 weeks ago

## 2021-01-10 NOTE — CHART NOTE - NSCHARTNOTEFT_GEN_A_CORE
CODY Note: CODY requested to meet with pt by MD as pt is emotional and discussing issues with housing. SW met with pt with  Angie. Pt stating she currently resides in a home with her in-laws as well as her 19 and 14 year old sons. Pt stating her  is currently a patient at [facility], in intubated, trached and has poor prognosis. Emotional support given. Pt stating she does not have a good relationship with her in-laws and they are threatening to kick her out even though she pays rent and utilities. Pt denied there ever being physical conflict in the home. SW educated pt on [facility] moratorium on evictions due to covid, [place]out from [place] website (translated to [place]) provided to pt for her records. Pt also stating her sisters have offered their homes in [place] for her to move in, but her in laws have told her she will be accused of "abandoning her " by the hospital if she moves in with her sister. SW made pt aware the hospital will not accuse her of doing so if she decided to move out of home. Pt further stated she is very upset because the hospital has only been in contact with her husbands parents in regards to his care while hospitalized, however she wants to be involved and updated as well. Pt reports her  did not have a HCP or anything in writing. CODY left message for floor SW of pt's  to f/u with this. Pt stating this has been a very emotional time for her. CODY provided pt with info to [employer] as well as DSS info for pt to inquire if there are any benefits she qualifies for. Pt to be d/c home this evening. No further SW services available at this time CODY Note: CODY requested to meet with pt by MD as pt is emotional and discussing issues with housing. SW met with pt with  Angie. Pt stating she currently resides in a home with her in-laws as well as her 19 and 14 year old sons. Pt stating her  is currently a patient at [facility], is intubated, trached and has poor prognosis. Emotional support given. Pt stating she does not have a good relationship with her in-laws and they are threatening to kick her out even though she pays rent and utilities. Pt denied there ever being physical conflict in the home. SW educated pt on [facility] moratorium on evictions due to covid, printout from [employer] website (translated to Chilean) provided to pt for her records. Pt also stating her sisters have offered their homes in [place] for her to move in, but her in laws have told her she will be accused of "abandoning her " by the hospital if she moves in with her sister. SW made pt aware the hospital will not accuse her of doing so if she decided to move out of home. Pt further stated she is very upset because the hospital has only been in contact with her husbands parents in regards to his care while hospitalized, however she wants to be involved and updated as well. Pt reports her  did not have a HCP or anything in writing. CODY left message for floor SW of pt's  to f/u with this. Pt stating this has been a very emotional time for her. CODY provided pt with info to DASH as well as DSS info for pt to inquire if there are any benefits she qualifies for. Pt to be d/c home this evening. No further SW services available at this time

## 2021-01-10 NOTE — ED PROVIDER NOTE - PATIENT PORTAL LINK FT
You can access the FollowMyHealth Patient Portal offered by [facility] by registering at the following website: [email or web address]. By joining [employer]’s FollowMyHealth portal, you will also be able to view your health information using other applications (apps) compatible with our system.

## 2021-01-10 NOTE — ED PROVIDER NOTE - NSFOLLOWUPINSTRUCTIONS_ED_ALL_ED_FT
Follow up with social work resources   Continue to take tylenol for symptoms  Follow up with GYN     Feel Better !!     - Follow up with your doctor within 2-3 days.   - Take Tylenol (Acetaminophen) 650mg or Motrin (Ibuprofen/Advil) 600mg every 6 hours as needed for pain.   - Stay well hydrated.   - Stay at home until you receive test results.   - See attached COVID-19 instructions.   - Return to the ED for any new or worsening symptoms.     Viral Respiratory Infection    A viral respiratory infection is an illness that affects parts of the body used for breathing, like the lungs, nose, and throat. It is caused by a germ called a virus. Symptoms can include runny nose, coughing, sneezing, fatigue, body aches, sore throat, fever, or headache. Over the counter medicine can be used to manage the symptoms but the infection typically goes away on its own in 5 to 10 days.     SEEK IMMEDIATE MEDICAL CARE IF YOU HAVE ANY OF THE FOLLOWING SYMPTOMS: shortness of breath, chest pain, fever over 10 days, or lightheadedness/dizziness.

## 2021-01-10 NOTE — ED PROVIDER NOTE - ATTENDING CONTRIBUTION TO CARE
Susan: I performed a face to face bedside interview with patient regarding history of present illness, review of symptoms and past medical history. I completed an independent physical exam.  I have discussed patient's plan of care with advanced care provider.   I agree with note as stated above including HISTORY OF PRESENT ILLNESS, HIV, PAST MEDICAL/SURGICAL/FAMILY/SOCIAL HISTORY, ALLERGIES AND HOME MEDICATIONS, REVIEW OF SYSTEMS, PHYSICAL EXAM, MEDICAL DECISION MAKING and any PROGRESS NOTES during the time I functioned as the attending physician for this patient  unless otherwise noted. My brief assessment is as follows: 40F p/w headache and nasal congestion also with issues with living arrangement.  is covid positive. Benign exam. Plan for tylenol. Patient seen by  and given resources. Has a safe place to go to. Return precautions given.

## 2021-01-11 LAB — SARS-COV-2 RNA SPEC QL NAA+PROBE: SIGNIFICANT CHANGE UP

## 2023-01-31 ENCOUNTER — APPOINTMENT (OUTPATIENT)
Dept: ANTEPARTUM | Facility: CLINIC | Age: 43
End: 2023-01-31
Payer: COMMERCIAL

## 2023-01-31 ENCOUNTER — ASOB RESULT (OUTPATIENT)
Age: 43
End: 2023-01-31

## 2023-01-31 DIAGNOSIS — Z36.82 ENCOUNTER FOR ANTENATAL SCREENING FOR NUCHAL TRANSLUCENCY: ICD-10-CM

## 2023-01-31 DIAGNOSIS — O35.10X0 MATERNAL CARE FOR (SUSPECTED) CHROMOSOMAL ABNORMALITY IN FETUS, UNSPECIFIED, NOT APPLICABLE OR UNSPECIFIED: ICD-10-CM

## 2023-01-31 PROCEDURE — 76813 OB US NUCHAL MEAS 1 GEST: CPT

## 2023-01-31 PROCEDURE — 36415 COLL VENOUS BLD VENIPUNCTURE: CPT

## 2023-02-06 LAB
ADDITIONAL US: NORMAL
COMMENTS: AFP: NORMAL
CRL SCAN TWIN B: NORMAL
CRL SCAN: NORMAL
CROWN RUMP LENGTH TWIN B: NORMAL
CROWN RUMP LENGTH: 59.7 MM
DOWN SYNDROME AGE RISK: NORMAL
DOWN SYNDROME INTERPRETATION: NORMAL
DOWN SYNDROME SCREENING RISK: NORMAL
GEST. AGE ON COLLECTION DATE: 12.3 WEEKS
HCG MOM: 0.76
HCG VALUE: 74.6 IU/ML
MATERNAL AGE AT EDD: 43.4 YR
NOTE: AFP: NORMAL
NT MOM TWIN B: NORMAL
NT TWIN B: NORMAL
NUCHAL TRANSLUCENCY (NT): 1.8 MM
NUCHAL TRANSLUCENCY MOM: 1.36
NUMBER OF FETUSES: 1
PAPP-A MOM: 0.91
PAPP-A VALUE: 760.8 NG/ML
RACE: NORMAL
RESULTS AFP: NORMAL
SONOGRAPHER ID#: NORMAL
SUBMIT PART 2 SAMPLE USING: NORMAL
TEST RESULTS: AFP: NORMAL
TRISOMY 18 AGE RISK: NORMAL
TRISOMY 18 INTERPRETATION: NORMAL
TRISOMY 18 SCREENING RISK: NORMAL
WEIGHT AFP: 157 LBS

## 2023-02-10 ENCOUNTER — NON-APPOINTMENT (OUTPATIENT)
Age: 43
End: 2023-02-10

## 2023-02-13 ENCOUNTER — ASOB RESULT (OUTPATIENT)
Age: 43
End: 2023-02-13

## 2023-02-13 ENCOUNTER — APPOINTMENT (OUTPATIENT)
Dept: MATERNAL FETAL MEDICINE | Facility: CLINIC | Age: 43
End: 2023-02-13
Payer: COMMERCIAL

## 2023-02-13 PROCEDURE — 99442: CPT | Mod: 95

## 2023-03-28 ENCOUNTER — APPOINTMENT (OUTPATIENT)
Dept: ANTEPARTUM | Facility: CLINIC | Age: 43
End: 2023-03-28
Payer: COMMERCIAL

## 2023-03-28 ENCOUNTER — ASOB RESULT (OUTPATIENT)
Age: 43
End: 2023-03-28

## 2023-03-28 PROCEDURE — 76811 OB US DETAILED SNGL FETUS: CPT

## 2023-03-30 ENCOUNTER — OUTPATIENT (OUTPATIENT)
Dept: OUTPATIENT SERVICES | Facility: HOSPITAL | Age: 43
LOS: 1 days | End: 2023-03-30
Payer: COMMERCIAL

## 2023-03-30 VITALS — DIASTOLIC BLOOD PRESSURE: 58 MMHG | SYSTOLIC BLOOD PRESSURE: 107 MMHG | HEART RATE: 89 BPM

## 2023-03-30 VITALS
RESPIRATION RATE: 18 BRPM | TEMPERATURE: 98 F | SYSTOLIC BLOOD PRESSURE: 107 MMHG | DIASTOLIC BLOOD PRESSURE: 58 MMHG | HEART RATE: 89 BPM

## 2023-03-30 LAB
APPEARANCE UR: CLEAR — SIGNIFICANT CHANGE UP
BACTERIA # UR AUTO: ABNORMAL
BILIRUB UR-MCNC: NEGATIVE — SIGNIFICANT CHANGE UP
COLOR SPEC: YELLOW — SIGNIFICANT CHANGE UP
DIFF PNL FLD: NEGATIVE — SIGNIFICANT CHANGE UP
EPI CELLS # UR: SIGNIFICANT CHANGE UP
GLUCOSE UR QL: NEGATIVE MG/DL — SIGNIFICANT CHANGE UP
KETONES UR-MCNC: NEGATIVE — SIGNIFICANT CHANGE UP
LEUKOCYTE ESTERASE UR-ACNC: ABNORMAL
NITRITE UR-MCNC: NEGATIVE — SIGNIFICANT CHANGE UP
PH UR: 6 — SIGNIFICANT CHANGE UP (ref 5–8)
PROT UR-MCNC: NEGATIVE — SIGNIFICANT CHANGE UP
RBC CASTS # UR COMP ASSIST: NEGATIVE /HPF — SIGNIFICANT CHANGE UP (ref 0–4)
SP GR SPEC: 1.02 — SIGNIFICANT CHANGE UP (ref 1.01–1.02)
UROBILINOGEN FLD QL: NEGATIVE MG/DL — SIGNIFICANT CHANGE UP
WBC UR QL: ABNORMAL /HPF (ref 0–5)

## 2023-03-30 PROCEDURE — 59050 FETAL MONITOR W/REPORT: CPT

## 2023-03-30 PROCEDURE — 87186 SC STD MICRODIL/AGAR DIL: CPT

## 2023-03-30 PROCEDURE — 81001 URINALYSIS AUTO W/SCOPE: CPT

## 2023-03-30 PROCEDURE — 59025 FETAL NON-STRESS TEST: CPT

## 2023-03-30 PROCEDURE — G0463: CPT

## 2023-03-30 PROCEDURE — 87086 URINE CULTURE/COLONY COUNT: CPT

## 2023-03-30 RX ORDER — ACETAMINOPHEN 500 MG
650 TABLET ORAL ONCE
Refills: 0 | Status: COMPLETED | OUTPATIENT
Start: 2023-03-30 | End: 2023-03-30

## 2023-03-30 RX ADMIN — Medication 650 MILLIGRAM(S): at 13:50

## 2023-03-30 NOTE — OB RN TRIAGE NOTE - FALL HARM RISK - UNIVERSAL INTERVENTIONS
Bed in lowest position, wheels locked, appropriate side rails in place/Call bell, personal items and telephone in reach/Instruct patient to call for assistance before getting out of bed or chair/Non-slip footwear when patient is out of bed/[place] to call system/Physically safe environment - no spills, clutter or unnecessary equipment/Purposeful Proactive Rounding/Room/bathroom lighting operational, light cord in reach

## 2023-03-30 NOTE — OB PROVIDER TRIAGE NOTE - NSHPPHYSICALEXAM_GEN_ALL_CORE
HR: 89 (30 Mar 2023 14:16) (89 - 89)  BP: 107/58 (30 Mar 2023 14:16) (107/58 - 107/58)    Gen: NAD, well-appearing  Heart: RRR  Lungs: CTAB  Abd: soft, gravid  Ext: non-edematous, non-tender   SSE: cervix visualized, closed and without any signs of bleeding or drainage, no pooling; nitrazine negative   FH: 137  [place]: no contractions

## 2023-03-30 NOTE — OB PROVIDER TRIAGE NOTE - NSOBPROVIDERNOTE_OBGYN_ALL_OB_FT
A/P: 43y  at 21w1d GA who is being evaluated for leakage of fluid.   -No other labor complaints  -VSS   -FH present, no contractions on toco   -Nitrazine negative, MARIA 5.3  -UA, UCX pending. Will call patient if indicated.     Dispo: Patient is stable for discharge to home with outpatient follow up. Differential diagnoses discussed with the patient. Return precautions given. Patient verbalized understanding and agreement with plan.     Discussed with Dr. Rosenbaum.

## 2023-03-30 NOTE — OB PROVIDER TRIAGE NOTE - HISTORY OF PRESENT ILLNESS
GEOFF DENNIS is a 43y  at 21w1d GA who presents to L&D for leakage of fluid. She felt like she was leaking while at work. Pt denies vaginal bleeding and contractions. She endorses good fetal movement. She denies any urinary complaints. She denies fevers, chills, nausea, vomiting.   Pregnancy course is significant for:  1. AMA   2. Prior CSx3    POB: full term CS x3; eTOP with D&C x1  PGYN: -fibroids/-cysts, denied STD hx, denies abnormal PAPs  PMH: Denies  PSH: CSx3  SH: Denies tobacco use, EtOH use and illicit drug use during the pregnancy; Feels safe at home  Meds: PNVs  All: NKDA

## 2023-05-10 ENCOUNTER — ASOB RESULT (OUTPATIENT)
Age: 43
End: 2023-05-10

## 2023-05-10 ENCOUNTER — APPOINTMENT (OUTPATIENT)
Dept: ANTEPARTUM | Facility: CLINIC | Age: 43
End: 2023-05-10
Payer: COMMERCIAL

## 2023-05-10 PROCEDURE — 76820 UMBILICAL ARTERY ECHO: CPT | Mod: 59

## 2023-05-10 PROCEDURE — 76816 OB US FOLLOW-UP PER FETUS: CPT

## 2023-05-17 ENCOUNTER — APPOINTMENT (OUTPATIENT)
Dept: ANTEPARTUM | Facility: CLINIC | Age: 43
End: 2023-05-17
Payer: COMMERCIAL

## 2023-05-17 ENCOUNTER — ASOB RESULT (OUTPATIENT)
Age: 43
End: 2023-05-17

## 2023-05-17 PROCEDURE — 76820 UMBILICAL ARTERY ECHO: CPT

## 2023-05-17 PROCEDURE — 93976 VASCULAR STUDY: CPT

## 2023-05-17 PROCEDURE — 76819 FETAL BIOPHYS PROFIL W/O NST: CPT

## 2023-05-17 PROCEDURE — 76821 MIDDLE CEREBRAL ARTERY ECHO: CPT

## 2023-05-24 ENCOUNTER — ASOB RESULT (OUTPATIENT)
Age: 43
End: 2023-05-24

## 2023-05-24 ENCOUNTER — APPOINTMENT (OUTPATIENT)
Dept: ANTEPARTUM | Facility: CLINIC | Age: 43
End: 2023-05-24
Payer: COMMERCIAL

## 2023-05-24 PROCEDURE — 76820 UMBILICAL ARTERY ECHO: CPT

## 2023-05-24 PROCEDURE — 76819 FETAL BIOPHYS PROFIL W/O NST: CPT

## 2023-05-31 ENCOUNTER — APPOINTMENT (OUTPATIENT)
Dept: ANTEPARTUM | Facility: CLINIC | Age: 43
End: 2023-05-31

## 2023-06-02 ENCOUNTER — APPOINTMENT (OUTPATIENT)
Dept: ANTEPARTUM | Facility: CLINIC | Age: 43
End: 2023-06-02

## 2023-06-02 ENCOUNTER — ASOB RESULT (OUTPATIENT)
Age: 43
End: 2023-06-02

## 2023-06-02 ENCOUNTER — APPOINTMENT (OUTPATIENT)
Dept: ANTEPARTUM | Facility: CLINIC | Age: 43
End: 2023-06-02
Payer: MEDICAID

## 2023-06-02 PROCEDURE — 76816 OB US FOLLOW-UP PER FETUS: CPT

## 2023-06-02 PROCEDURE — 76820 UMBILICAL ARTERY ECHO: CPT | Mod: 59

## 2023-06-02 PROCEDURE — 76819 FETAL BIOPHYS PROFIL W/O NST: CPT

## 2023-06-15 ENCOUNTER — ASOB RESULT (OUTPATIENT)
Age: 43
End: 2023-06-15

## 2023-06-15 ENCOUNTER — APPOINTMENT (OUTPATIENT)
Dept: ANTEPARTUM | Facility: CLINIC | Age: 43
End: 2023-06-15
Payer: MEDICAID

## 2023-06-15 PROCEDURE — 76819 FETAL BIOPHYS PROFIL W/O NST: CPT

## 2023-06-15 PROCEDURE — 93976 VASCULAR STUDY: CPT

## 2023-06-15 PROCEDURE — 76820 UMBILICAL ARTERY ECHO: CPT

## 2023-06-22 ENCOUNTER — APPOINTMENT (OUTPATIENT)
Dept: ANTEPARTUM | Facility: CLINIC | Age: 43
End: 2023-06-22
Payer: MEDICAID

## 2023-06-22 ENCOUNTER — ASOB RESULT (OUTPATIENT)
Age: 43
End: 2023-06-22

## 2023-06-22 PROCEDURE — 93976 VASCULAR STUDY: CPT

## 2023-06-22 PROCEDURE — 76820 UMBILICAL ARTERY ECHO: CPT | Mod: 59

## 2023-06-22 PROCEDURE — 76818 FETAL BIOPHYS PROFILE W/NST: CPT

## 2023-06-22 PROCEDURE — 76816 OB US FOLLOW-UP PER FETUS: CPT

## 2023-06-29 ENCOUNTER — APPOINTMENT (OUTPATIENT)
Dept: ANTEPARTUM | Facility: CLINIC | Age: 43
End: 2023-06-29
Payer: MEDICAID

## 2023-06-29 ENCOUNTER — ASOB RESULT (OUTPATIENT)
Age: 43
End: 2023-06-29

## 2023-06-29 PROCEDURE — 76821 MIDDLE CEREBRAL ARTERY ECHO: CPT

## 2023-06-29 PROCEDURE — 76820 UMBILICAL ARTERY ECHO: CPT

## 2023-06-29 PROCEDURE — 76818 FETAL BIOPHYS PROFILE W/NST: CPT

## 2023-07-06 ENCOUNTER — APPOINTMENT (OUTPATIENT)
Dept: ANTEPARTUM | Facility: CLINIC | Age: 43
End: 2023-07-06

## 2023-07-12 ENCOUNTER — APPOINTMENT (OUTPATIENT)
Dept: MATERNAL FETAL MEDICINE | Facility: CLINIC | Age: 43
End: 2023-07-12

## 2023-07-13 ENCOUNTER — ASOB RESULT (OUTPATIENT)
Age: 43
End: 2023-07-13

## 2023-07-13 ENCOUNTER — APPOINTMENT (OUTPATIENT)
Dept: ANTEPARTUM | Facility: CLINIC | Age: 43
End: 2023-07-13
Payer: MEDICAID

## 2023-07-13 PROCEDURE — 76818 FETAL BIOPHYS PROFILE W/NST: CPT

## 2023-07-13 PROCEDURE — 76816 OB US FOLLOW-UP PER FETUS: CPT

## 2023-07-13 PROCEDURE — ZZZZZ: CPT

## 2023-07-13 PROCEDURE — 76820 UMBILICAL ARTERY ECHO: CPT | Mod: 59

## 2023-07-17 ENCOUNTER — APPOINTMENT (OUTPATIENT)
Dept: ANTEPARTUM | Facility: CLINIC | Age: 43
End: 2023-07-17

## 2023-07-17 RX ORDER — FAMOTIDINE 10 MG/ML
20 INJECTION INTRAVENOUS ONCE
Refills: 0 | Status: COMPLETED | OUTPATIENT
Start: 2023-07-21 | End: 2023-07-21

## 2023-07-17 RX ORDER — ACETAMINOPHEN 500 MG
975 TABLET ORAL ONCE
Refills: 0 | Status: COMPLETED | OUTPATIENT
Start: 2023-07-21 | End: 2023-07-21

## 2023-07-17 RX ORDER — SODIUM CHLORIDE 9 MG/ML
1000 INJECTION, SOLUTION INTRAVENOUS
Refills: 0 | Status: DISCONTINUED | OUTPATIENT
Start: 2023-07-21 | End: 2023-07-21

## 2023-07-17 RX ORDER — SCOPALAMINE 1 MG/3D
1 PATCH, EXTENDED RELEASE TRANSDERMAL ONCE
Refills: 0 | Status: COMPLETED | OUTPATIENT
Start: 2023-07-21 | End: 2023-07-21

## 2023-07-17 RX ORDER — SODIUM CHLORIDE 9 MG/ML
1000 INJECTION, SOLUTION INTRAVENOUS ONCE
Refills: 0 | Status: COMPLETED | OUTPATIENT
Start: 2023-07-21 | End: 2023-07-21

## 2023-07-17 RX ORDER — CITRIC ACID/SODIUM CITRATE 300-500 MG
30 SOLUTION, ORAL ORAL ONCE
Refills: 0 | Status: COMPLETED | OUTPATIENT
Start: 2023-07-21 | End: 2023-07-21

## 2023-07-17 RX ORDER — OXYTOCIN 10 UNIT/ML
333.33 VIAL (ML) INJECTION
Qty: 20 | Refills: 0 | Status: DISCONTINUED | OUTPATIENT
Start: 2023-07-21 | End: 2023-07-23

## 2023-07-17 RX ORDER — CEFAZOLIN SODIUM 1 G
2000 VIAL (EA) INJECTION ONCE
Refills: 0 | Status: DISCONTINUED | OUTPATIENT
Start: 2023-07-21 | End: 2023-07-21

## 2023-07-20 ENCOUNTER — APPOINTMENT (OUTPATIENT)
Dept: ANTEPARTUM | Facility: CLINIC | Age: 43
End: 2023-07-20
Payer: MEDICAID

## 2023-07-20 ENCOUNTER — TRANSCRIPTION ENCOUNTER (OUTPATIENT)
Age: 43
End: 2023-07-20

## 2023-07-20 ENCOUNTER — ASOB RESULT (OUTPATIENT)
Age: 43
End: 2023-07-20

## 2023-07-20 PROCEDURE — 93976 VASCULAR STUDY: CPT

## 2023-07-20 PROCEDURE — 76820 UMBILICAL ARTERY ECHO: CPT

## 2023-07-20 PROCEDURE — 76818 FETAL BIOPHYS PROFILE W/NST: CPT

## 2023-07-21 ENCOUNTER — APPOINTMENT (OUTPATIENT)
Dept: MATERNAL FETAL MEDICINE | Facility: CLINIC | Age: 43
End: 2023-07-21

## 2023-07-21 ENCOUNTER — INPATIENT (INPATIENT)
Facility: HOSPITAL | Age: 43
LOS: 1 days | Discharge: HOME | End: 2023-07-23
Attending: OBSTETRICS & GYNECOLOGY | Admitting: OBSTETRICS & GYNECOLOGY
Payer: COMMERCIAL

## 2023-07-21 ENCOUNTER — TRANSCRIPTION ENCOUNTER (OUTPATIENT)
Age: 43
End: 2023-07-21

## 2023-07-21 VITALS
TEMPERATURE: 98 F | DIASTOLIC BLOOD PRESSURE: 74 MMHG | HEART RATE: 82 BPM | RESPIRATION RATE: 18 BRPM | HEIGHT: 61 IN | SYSTOLIC BLOOD PRESSURE: 117 MMHG | WEIGHT: 164.02 LBS

## 2023-07-21 DIAGNOSIS — O34.211 MATERNAL CARE FOR LOW TRANSVERSE SCAR FROM PREVIOUS CESAREAN DELIVERY: ICD-10-CM

## 2023-07-21 LAB
ALBUMIN SERPL ELPH-MCNC: 3.3 G/DL — SIGNIFICANT CHANGE UP (ref 3.3–5.2)
ALP SERPL-CCNC: 112 U/L — SIGNIFICANT CHANGE UP (ref 40–120)
ALT FLD-CCNC: 8 U/L — SIGNIFICANT CHANGE UP
ANION GAP SERPL CALC-SCNC: 14 MMOL/L — SIGNIFICANT CHANGE UP (ref 5–17)
AST SERPL-CCNC: 13 U/L — SIGNIFICANT CHANGE UP
BASOPHILS # BLD AUTO: 0.03 K/UL — SIGNIFICANT CHANGE UP (ref 0–0.2)
BASOPHILS NFR BLD AUTO: 0.3 % — SIGNIFICANT CHANGE UP (ref 0–2)
BILIRUB SERPL-MCNC: 0.3 MG/DL — LOW (ref 0.4–2)
BLD GP AB SCN SERPL QL: SIGNIFICANT CHANGE UP
BUN SERPL-MCNC: 7.5 MG/DL — LOW (ref 8–20)
CALCIUM SERPL-MCNC: 8.4 MG/DL — SIGNIFICANT CHANGE UP (ref 8.4–10.5)
CHLORIDE SERPL-SCNC: 103 MMOL/L — SIGNIFICANT CHANGE UP (ref 96–108)
CO2 SERPL-SCNC: 18 MMOL/L — LOW (ref 22–29)
CREAT SERPL-MCNC: 0.42 MG/DL — LOW (ref 0.5–1.3)
EGFR: 124 ML/MIN/1.73M2 — SIGNIFICANT CHANGE UP
EOSINOPHIL # BLD AUTO: 0.08 K/UL — SIGNIFICANT CHANGE UP (ref 0–0.5)
EOSINOPHIL NFR BLD AUTO: 0.9 % — SIGNIFICANT CHANGE UP (ref 0–6)
GLUCOSE BLDC GLUCOMTR-MCNC: 82 MG/DL — SIGNIFICANT CHANGE UP (ref 70–99)
GLUCOSE SERPL-MCNC: 79 MG/DL — SIGNIFICANT CHANGE UP (ref 70–99)
HCT VFR BLD CALC: 35.8 % — SIGNIFICANT CHANGE UP (ref 34.5–45)
HGB BLD-MCNC: 12.6 G/DL — SIGNIFICANT CHANGE UP (ref 11.5–15.5)
IMM GRANULOCYTES NFR BLD AUTO: 0.6 % — SIGNIFICANT CHANGE UP (ref 0–0.9)
LYMPHOCYTES # BLD AUTO: 2.24 K/UL — SIGNIFICANT CHANGE UP (ref 1–3.3)
LYMPHOCYTES # BLD AUTO: 25.3 % — SIGNIFICANT CHANGE UP (ref 13–44)
MCHC RBC-ENTMCNC: 31.6 PG — SIGNIFICANT CHANGE UP (ref 27–34)
MCHC RBC-ENTMCNC: 35.2 GM/DL — SIGNIFICANT CHANGE UP (ref 32–36)
MCV RBC AUTO: 89.7 FL — SIGNIFICANT CHANGE UP (ref 80–100)
MONOCYTES # BLD AUTO: 0.66 K/UL — SIGNIFICANT CHANGE UP (ref 0–0.9)
MONOCYTES NFR BLD AUTO: 7.5 % — SIGNIFICANT CHANGE UP (ref 2–14)
NEUTROPHILS # BLD AUTO: 5.79 K/UL — SIGNIFICANT CHANGE UP (ref 1.8–7.4)
NEUTROPHILS NFR BLD AUTO: 65.4 % — SIGNIFICANT CHANGE UP (ref 43–77)
PLATELET # BLD AUTO: 267 K/UL — SIGNIFICANT CHANGE UP (ref 150–400)
POTASSIUM SERPL-MCNC: 3.9 MMOL/L — SIGNIFICANT CHANGE UP (ref 3.5–5.3)
POTASSIUM SERPL-SCNC: 3.9 MMOL/L — SIGNIFICANT CHANGE UP (ref 3.5–5.3)
PROT SERPL-MCNC: 6.3 G/DL — LOW (ref 6.6–8.7)
RBC # BLD: 3.99 M/UL — SIGNIFICANT CHANGE UP (ref 3.8–5.2)
RBC # FLD: 12.6 % — SIGNIFICANT CHANGE UP (ref 10.3–14.5)
SODIUM SERPL-SCNC: 135 MMOL/L — SIGNIFICANT CHANGE UP (ref 135–145)
WBC # BLD: 8.85 K/UL — SIGNIFICANT CHANGE UP (ref 3.8–10.5)
WBC # FLD AUTO: 8.85 K/UL — SIGNIFICANT CHANGE UP (ref 3.8–10.5)

## 2023-07-21 PROCEDURE — 88307 TISSUE EXAM BY PATHOLOGIST: CPT | Mod: 26

## 2023-07-21 PROCEDURE — 88302 TISSUE EXAM BY PATHOLOGIST: CPT | Mod: 26

## 2023-07-21 RX ORDER — SIMETHICONE 80 MG/1
80 TABLET, CHEWABLE ORAL EVERY 4 HOURS
Refills: 0 | Status: DISCONTINUED | OUTPATIENT
Start: 2023-07-21 | End: 2023-07-23

## 2023-07-21 RX ORDER — KETOROLAC TROMETHAMINE 30 MG/ML
30 SYRINGE (ML) INJECTION EVERY 6 HOURS
Refills: 0 | Status: COMPLETED | OUTPATIENT
Start: 2023-07-21 | End: 2023-07-23

## 2023-07-21 RX ORDER — TRANEXAMIC ACID 100 MG/ML
1000 INJECTION, SOLUTION INTRAVENOUS ONCE
Refills: 0 | Status: COMPLETED | OUTPATIENT
Start: 2023-07-21 | End: 2023-07-21

## 2023-07-21 RX ORDER — IBUPROFEN 200 MG
600 TABLET ORAL EVERY 6 HOURS
Refills: 0 | Status: COMPLETED | OUTPATIENT
Start: 2023-07-21 | End: 2024-06-18

## 2023-07-21 RX ORDER — ENOXAPARIN SODIUM 100 MG/ML
40 INJECTION SUBCUTANEOUS EVERY 24 HOURS
Refills: 0 | Status: DISCONTINUED | OUTPATIENT
Start: 2023-07-22 | End: 2023-07-23

## 2023-07-21 RX ORDER — MAGNESIUM HYDROXIDE 400 MG/1
30 TABLET, CHEWABLE ORAL
Refills: 0 | Status: DISCONTINUED | OUTPATIENT
Start: 2023-07-21 | End: 2023-07-23

## 2023-07-21 RX ORDER — CEFAZOLIN SODIUM 1 G
2000 VIAL (EA) INJECTION ONCE
Refills: 0 | Status: COMPLETED | OUTPATIENT
Start: 2023-07-21 | End: 2023-07-21

## 2023-07-21 RX ORDER — ACETAMINOPHEN 500 MG
975 TABLET ORAL
Refills: 0 | Status: DISCONTINUED | OUTPATIENT
Start: 2023-07-21 | End: 2023-07-23

## 2023-07-21 RX ORDER — DIPHENHYDRAMINE HCL 50 MG
25 CAPSULE ORAL EVERY 6 HOURS
Refills: 0 | Status: DISCONTINUED | OUTPATIENT
Start: 2023-07-21 | End: 2023-07-23

## 2023-07-21 RX ORDER — SODIUM CHLORIDE 9 MG/ML
1000 INJECTION, SOLUTION INTRAVENOUS
Refills: 0 | Status: DISCONTINUED | OUTPATIENT
Start: 2023-07-21 | End: 2023-07-23

## 2023-07-21 RX ORDER — OXYTOCIN 10 UNIT/ML
333.33 VIAL (ML) INJECTION
Qty: 20 | Refills: 0 | Status: DISCONTINUED | OUTPATIENT
Start: 2023-07-21 | End: 2023-07-23

## 2023-07-21 RX ORDER — TETANUS TOXOID, REDUCED DIPHTHERIA TOXOID AND ACELLULAR PERTUSSIS VACCINE, ADSORBED 5; 2.5; 8; 8; 2.5 [IU]/.5ML; [IU]/.5ML; UG/.5ML; UG/.5ML; UG/.5ML
0.5 SUSPENSION INTRAMUSCULAR ONCE
Refills: 0 | Status: DISCONTINUED | OUTPATIENT
Start: 2023-07-21 | End: 2023-07-23

## 2023-07-21 RX ORDER — LANOLIN
1 OINTMENT (GRAM) TOPICAL EVERY 6 HOURS
Refills: 0 | Status: DISCONTINUED | OUTPATIENT
Start: 2023-07-21 | End: 2023-07-23

## 2023-07-21 RX ORDER — OXYCODONE HYDROCHLORIDE 5 MG/1
5 TABLET ORAL
Refills: 0 | Status: DISCONTINUED | OUTPATIENT
Start: 2023-07-21 | End: 2023-07-23

## 2023-07-21 RX ORDER — OXYCODONE HYDROCHLORIDE 5 MG/1
5 TABLET ORAL ONCE
Refills: 0 | Status: DISCONTINUED | OUTPATIENT
Start: 2023-07-21 | End: 2023-07-23

## 2023-07-21 RX ADMIN — Medication 30 MILLILITER(S): at 13:09

## 2023-07-21 RX ADMIN — Medication 1000 MILLIUNIT(S)/MIN: at 14:20

## 2023-07-21 RX ADMIN — Medication 30 MILLIGRAM(S): at 23:25

## 2023-07-21 RX ADMIN — TRANEXAMIC ACID 220 MILLIGRAM(S): 100 INJECTION, SOLUTION INTRAVENOUS at 14:00

## 2023-07-21 RX ADMIN — FAMOTIDINE 20 MILLIGRAM(S): 10 INJECTION INTRAVENOUS at 13:09

## 2023-07-21 RX ADMIN — Medication 2000 MILLIGRAM(S): at 13:55

## 2023-07-21 RX ADMIN — SCOPALAMINE 1 PATCH: 1 PATCH, EXTENDED RELEASE TRANSDERMAL at 11:39

## 2023-07-21 RX ADMIN — Medication 975 MILLIGRAM(S): at 20:11

## 2023-07-21 RX ADMIN — Medication 975 MILLIGRAM(S): at 20:09

## 2023-07-21 RX ADMIN — Medication 975 MILLIGRAM(S): at 14:09

## 2023-07-21 RX ADMIN — SODIUM CHLORIDE 2000 MILLILITER(S): 9 INJECTION, SOLUTION INTRAVENOUS at 11:00

## 2023-07-21 RX ADMIN — SODIUM CHLORIDE 125 MILLILITER(S): 9 INJECTION, SOLUTION INTRAVENOUS at 11:26

## 2023-07-21 RX ADMIN — Medication 975 MILLIGRAM(S): at 13:09

## 2023-07-21 RX ADMIN — Medication 30 MILLIGRAM(S): at 23:16

## 2023-07-21 NOTE — OB RN INTRAOPERATIVE NOTE - NSSELHIDDEN_OBGYN_ALL_OB_FT
[NS_DeliveryRN_OBGYN_ALL_OB_FT:[id number]] [NS_DeliveryRN_OBGYN_ALL_OB_FT:[id number]],[NS_DeliveryAttending1_OBGYN_ALL_OB_FT:[id number]=],[NS_DeliveryAssist1_OBGYN_ALL_OB_FT:[id number]=]

## 2023-07-21 NOTE — OB RN PATIENT PROFILE - FALL HARM RISK - UNIVERSAL INTERVENTIONS
Bed in lowest position, wheels locked, appropriate side rails in place/Call bell, personal items and telephone in reach/Instruct patient to call for assistance before getting out of bed or chair/Non-slip footwear when patient is out of bed/[place] to call system/Purposeful Proactive Rounding/Room/bathroom lighting operational, light cord in reach

## 2023-07-21 NOTE — OB PROVIDER DELIVERY SUMMARY - NSSELHIDDEN_OBGYN_ALL_OB_FT
[NS_DeliveryRN_OBGYN_ALL_OB_FT:[id number]],[NS_DeliveryAttending1_OBGYN_ALL_OB_FT:[id number]=],[NS_DeliveryAssist1_OBGYN_ALL_OB_FT:[id number]=]

## 2023-07-21 NOTE — OB RN DELIVERY SUMMARY - NSSELHIDDEN_OBGYN_ALL_OB_FT
[NS_DeliveryRN_OBGYN_ALL_OB_FT:[id number]] [NS_DeliveryRN_OBGYN_ALL_OB_FT:[id number]],[NS_DeliveryAttending1_OBGYN_ALL_OB_FT:[id number]=],[NS_DeliveryAssist1_OBGYN_ALL_OB_FT:[id number]=]

## 2023-07-21 NOTE — OB RN DELIVERY SUMMARY - NS_SKININTERRUPTA_OBGYN_ALL_OB
"  SUBJECTIVE      Reina Hutchinson is a 15 year old female who is accompanied by Mother. Reina Hutchinson presents to clinic today for the following health issue(s):        SYMPTOMS    Duration: 7 days     Description  nasal congestion, sore throat, cough, ear pain right, headache, fatigue/malaise and hoarse voice    Severity: Not resolving     Accompanying signs and symptoms: None    History (predisposing factors):  [place] and diagnosed with Rt OM and treated with Bactrim, not helping at all    Precipitating or alleviating factors: None    Therapies tried and outcome:  Tylenol, Vicks cough drops, steam, Nyquil, and Dayquil       Health Maintenance        Health Maintenance Due   Topic Date Due     VARICELLA IMMUNIZATION (2 of 2 - 2-dose childhood series) 06/24/2008     INFLUENZA VACCINE (1) 09/01/2019     HIV SCREENING  04/23/2019       PCP   Edna Santana -[phone]    PROBLEM LIST        Patient Active Problem List   Diagnosis     Molluscum contagiosum     Obesity     Vitamin D deficiency     Overweight, pediatric, BMI (body mass index) 95-99% for age       MEDICATIONS        Current Outpatient Medications   Medication     sulfamethoxazole-trimethoprim (BACTRIM DS/SEPTRA DS) 800-160 MG tablet     No current facility-administered medications for this visit.        Reviewed and updated as needed this visit by Provider:  Tobacco  Allergies  Meds  Med Hx  Surg Hx  Fam Hx  Soc Hx     ROS      Constitutional, HEENT, cardiovascular, pulmonary, gi and gu systems are negative, except as otherwise noted.    PHYSICAL EXAM   /71   Pulse 80   Temp 98.6  F (37  C) (Tympanic)   Resp 16   Ht 1.651 m (5' 5\")   Wt 94.8 kg (209 lb)   BMI 34.78 kg/m    Body mass index is 34.78 kg/m .  GENERAL APPEARANCE: healthy, alert and no distress  EYES: Eyes grossly normal to inspection, PERRL and conjunctivae and sclerae normal  HENT: ear canals and TM's normal, nose and mouth without ulcers or lesions, TM " congested/bulging right, TM fluid bilateral and rhinorrhea clear, bilateral tonsillar hypertrophy with crypts  NECK: no adenopathy, no asymmetry, masses, or scars and thyroid normal to palpation  RESP: lungs clear to auscultation - no rales, rhonchi or wheezes  CV: regular rates and rhythm, normal S1 S2, no S3 or S4 and no murmur, click or rub  ABDOMEN: soft, nontender, without hepatosplenomegaly or masses and bowel sounds normal  MS: extremities normal- no gross deformities noted  SKIN: no suspicious lesions or rashes  PSYCH: mentation appears normal and affect normal/bright      ASSESSMENT & PLAN     1. Acute suppurative otitis media of right ear without spontaneous rupture of tympanic membrane, recurrence not specified  Acute, not improving  Stop Bactrim  Start- amoxicillin (AMOXIL) 500 MG tablet; Take 1 tablet (500 mg) by mouth 2 times daily for 10 days  Dispense: 20 tablet; Refill: 0    2. Cough  Acute, stable  - Influenza A/B antigen    3. Sore throat  Acute, stable  - Rapid strep screen  - Influenza A/B antigen  - Beta strep group A culture    4. Exposure to the flu  Acute, stable  - Influenza A/B antigen  Results for orders placed or performed in visit on 01/23/20   Rapid strep screen     Status: None   Result Value Ref Range    Specimen Description Throat     Rapid Strep A Screen       NEGATIVE: No Group A streptococcal antigen detected by immunoassay, await culture report.   Influenza A/B antigen     Status: None   Result Value Ref Range    Influenza A/B Agn Specimen Nasal     Influenza A Negative NEG^Negative    Influenza B Negative NEG^Negative       Patient Education     Middle Ear Infection (Adult)  You have an infection of the middle ear, the space behind the eardrum. This is also called acute otitis media (AOM). Sometimes it is caused by the common cold. This is because congestion can block the internal passage (eustachian tube) that drains fluid from the middle ear. When the middle ear fills with  fluid, bacteria can grow there and cause an infection. Oral antibiotics are used to treat this illness, not ear drops. Symptoms usually start to improve within 1 to 2 days of treatment.    Home care  The following are general care guidelines:    Finish all of the antibiotic medicine given, even though you may feel better after the first few days.    You may use over-the-counter medicine, such as acetaminophen or ibuprofen, to control pain and fever, unless something else was prescribed. If you have chronic liver or kidney disease or have ever had a stomach ulcer or gastrointestinal bleeding, talk with your healthcare provider before using these medicines. Do not give aspirin to anyone under 18 years of age who has a fever. It may cause severe illness or death.  Follow-up care  Follow up with your healthcare provider, or as advised, in 2 weeks if all symptoms have not gotten better, or if hearing doesn't go back to normal within 1 month.  When to seek medical advice  Call your healthcare provider right away if any of these occur:    Ear pain gets worse or does not improve after 3 days of treatment    Unusual drowsiness or confusion    Neck pain, stiff neck, or headache    Fluid or blood draining from the ear canal    Fever of 100.4 F (38 C) or as advised     Seizure  Date Last Reviewed: 6/1/2016 2000-2019 [employer]. [address] [place] [address], [place] [address]. All rights reserved. This information is not intended as a substitute for professional medical care. Always follow your healthcare professional's instructions.           Patient Education     Self-Care for Sore Throats    Sore throats happen for many reasons, such as colds, allergies, and infections caused by viruses or bacteria. In any case, your throat becomes red and sore. Your goal for self-care is to reduce your discomfort while giving your throat a chance to heal.  Moisten and soothe your throat  Tips include the following:    Try a sip  of water first thing after waking up.    Keep your throat moist by drinking 6 or more glasses of clear liquids every day.    Run a cool-air humidifier in your room overnight.    Avoid cigarette smoke.     Suck on throat lozenges, cough drops, hard candy, ice chips, or frozen fruit-juice bars. Use the sugar-free versions if your diet or medical condition requires them.  Gargle to ease irritation  Gargling every hour or 2 can ease irritation. Try gargling with 1 of these solutions:    1/4 teaspoon of salt in 1/2 cup of warm water    An over-the-counter anesthetic gargle  Use medicine for more relief  Over-the-counter medicine can reduce sore throat symptoms. Ask your pharmacist if you have questions about which medicine to use:    Ease pain with anesthetic sprays. Aspirin or an aspirin substitute also helps. Remember, never give aspirin to anyone 18 or younger, or if you are already taking blood thinners.     For sore throats caused by allergies, try antihistamines to block the allergic reaction.    Remember: unless a sore throat is caused by a bacterial infection, antibiotics won t help you.  Prevent future sore throats  Prevention tips include the following:    Stop smoking or reduce contact with secondhand smoke. Smoke irritates the tender throat lining.    Limit contact with pets and with allergy-causing substances, such as pollen and mold.    When you re around someone with a sore throat or cold, wash your hands often to keep viruses or bacteria from spreading.    Don t strain your vocal cords.  Contact your healthcare provider if you have:    A temperature over 101 F (38.3 C)    White spots on the throat    Great difficulty swallowing    Trouble breathing    A skin rash    Recent exposure to someone else with strep bacteria    Severe hoarseness and swollen glands in the neck or jaw  Date Last Reviewed: 8/1/2016 2000-2019 [employer]. [address] [place] [address], [place] [address]. All rights  reserved. This information is not intended as a substitute for professional medical care. Always follow your healthcare professional's instructions.             Home care instructions are reviewed with the parent or caregiver. The risks, benefits and treatment options of prescribed medications or other treatments have been discussed with the parent. The parent verbalized their understanding and should call or follow up if no improvement or if they develop further problems.    TALITA Gonzalez-BC  [facility]           Maternal request, with counseling and support

## 2023-07-21 NOTE — OB PROVIDER H&P - ASSESSMENT
A/P: GEOFF Patient is a 43 year old  at 37w5d by LMP who presents to L&D for rCS for GDMA2 and severe IUGR.     - Admit to L&D  - Consent  - Admission labs ordered  - IV fluids  - Fetus: Cat I tracing. Continuous toco and fetal monitoring.   - GBS: Negative  - Analgesia desired:     Discussed with Dr. Rosenbaum A/P: GEOFF Patient is a 43 year old  at 37w5d by LMP who presents to L&D for rCS for GDMA2 and severe IUGR, also BTL.     - Admit to L&D  - Consent  - Admission labs ordered  - IV fluids  - Fetus: Cat I tracing. Continuous toco and fetal monitoring.   - GBS: Negative    Discussed with Dr. Rosenbaum

## 2023-07-21 NOTE — OB NEONATOLOGY/PEDIATRICIAN DELIVERY SUMMARY - NSPEDSNEONOTESA_OBGYN_ALL_OB_FT
Requested by DR Rosenbaum to attend a RC/S of a 44y/o  at 37.2 weeks GA secondary to severe IUGR.......  She had + PNC, is blood type A pos, HIV neg, HBsAg neg, RPR NR, Rubella Imm, GBS neg, GDMA1  L&D:  AROM at delivery.....  Baby born vertex with good cry, transferred to warmer, orally suctioned, dried, and examined. Infant showed to baby's Aunt and then transferred to mother for STS.  A/P:  37 week GA, IUGR, IDM  Transition to Regular Nursery under Peds Hospitalist care.  Monitor glucose as per Guidelines Requested by DR Rosenbaum to attend a RC/S of a 42y/o  at 37.2 weeks GA secondary to severe IUGR.......  She had + PNC, is blood type A pos, HIV neg, HBsAg neg, RPR NR, Rubella Imm, GBS neg, GDMA1  L&D:  AROM at delivery with clear amniotic fluids.  Baby born vertex with good cry, DCC x30sec,  transferred to warmer, orally suctioned, dried, and examined. Infant showed to baby's Aunt and then to be transferred to mother for STS.  BW: 2180g  A/P:  37 week GA, IUGR, IDM  Transition to Regular Nursery under Peds Hospitalist care.  Monitor glucose as per Guidelines

## 2023-07-21 NOTE — DISCHARGE NOTE OB - CARE PROVIDER_API CALL
Gigi Vinson  Obstetrics and Gynecology  [facility] [place] [address]  Phone: [phone]  Fax: [phone]  Follow Up Time:

## 2023-07-21 NOTE — DISCHARGE NOTE OB - PATIENT PORTAL LINK FT
You can access the FollowMyHealth Patient Portal offered by [facility] by registering at the following website: [email or web address]. By joining [employer]’s FollowMyHealth portal, you will also be able to view your health information using other applications (apps) compatible with our system.

## 2023-07-21 NOTE — OB PROVIDER H&P - ATTENDING COMMENTS
43 year old  at 37w5d  here for repeat  section with bilateral salpingectomy due to IUGR  hx of GDMA 1  patient accepts blood transfusion  denies hx of medical issues   EFM cat 1  pt was explained the risk of c section including the risk of infection, hemorrhage, need for blood transfusion, injury to bowel or bladder, risk of hysterectomy. patient desires sterilization and understands that it is permanent.  plan   NPO  TXA 1gm   cross match 2 u PRBC  DVT ppx

## 2023-07-21 NOTE — OB RN PATIENT PROFILE - NS TRANSFER PATIENT BELONGINGS
Left message regarding scheduling follow up appt with Dr. Martell Cortez. Electronic Device (specify)

## 2023-07-21 NOTE — OB RN DELIVERY SUMMARY - NS_SEPSISRSKCALC_OBGYN_ALL_OB_FT
EOS calculated successfully. EOS Risk Factor: 0.5/1000 live births ([facility] national incidence); GA=37w2d; Temp=98.24; ROM=0; GBS='Negative'; Antibiotics='No antibiotics or any antibiotics < 2 hrs prior to birth'

## 2023-07-21 NOTE — OB RN PATIENT PROFILE - THE METHOD OF FEEDING WHEN THE NEWBORN REQUESTS AND CONTINUING EACH FEEDING SESSION UNTIL THE NEWBORN IS SATISFIED
Spencer Deni presents today for one unit PRBC transfusion. Labs results from 3/6 6.9/22.7. This nurse explained plan of care to transfuse one unit PRBC, oral premedications for, possible s/s of adverse reaction to.  Linda took 2 tabs of norco this morning, no ty Statement Selected

## 2023-07-21 NOTE — OB PROVIDER H&P - HISTORY OF PRESENT ILLNESS
Patient is a 43 year old  at 37w5d by LMP who presents to L&D for rCS for GDMA2 and severe IUGR.     DESIRE: 23   LMP: 22     Pregnancy course complicated by GDMA2, AMA, prior CS x3, severe IUGR <1%      Obhx: , FT CS x3 (98, 01, 07), TOP x1   Gynhx: +fibroids, denies ovarian cysts, abn paps, STIs.    Pmhx: denies   Pshx: CS x3   Meds: PNV, ASA,    Allergies: NKDA   Social Hx: Denies tobacco, alchol, and recreational drug use.      BMI: 30.85   Ultrasound: vertex, anterior placenta,                       UA S/D ratio 2.13, 42%ile   EFW: 1966 on    PPBC: BTL (consent signed)

## 2023-07-21 NOTE — OB RN PATIENT PROFILE - NSICDXPASTMEDICALHX_GEN_ALL_CORE_FT
PAST MEDICAL HISTORY:  GDM (gestational diabetes mellitus), class A1     H/O  section     No pertinent past medical history

## 2023-07-21 NOTE — OB RN DELIVERY SUMMARY - NS_GENERALBABYACOMMENTA_OBGYN_ALL_OB_FT
Infant returned skin to skin in PACU but removed at patient's request and given formula after glucose administration for hypoglycemia. Repeat glucose WNL.

## 2023-07-21 NOTE — OB PROVIDER DELIVERY SUMMARY - NSNUMBEROFNEWBORNS_OBGYN_ALL_OB_NU
1
Baby is a 34.5 wk GA female born to a 35 y/o  mother via C/S. PEDS called to delivery for  birth. Maternal history complicated by marginal previa. Prenatal history uncomplicated. Mother received beta x1. Maternal blood type B+. PNL negative, non-reactive, and immune. COVID positive. GBS unknown. AROM at time of delivery, clear. Baby born vigorous and crying spontaneously. Warmed, dried, stimulated. Apgars 9/9. EOS 0.35. CPAP 6, 30% started in OR. Mom plans to breastfeed and bottle feed and consents hepB.    NICU Course (-):  Respiratory:  Initially on CPAP 6 30%, CXR c/w  respiratory distress syndrome. Weaned off respiratory support on DOL #1. Remained stable in RA.  ID: CBC/diff wnl.  COVID swabs at 24 and 48 HOL were negative.   CV:  Hemodynamically stable.    Heme:  Bld types: B+/B+/C-  Hct stable. Baby required phototherapy 1/15- for hyperbilirubinemia. Phototherapy was discontinued and rebound bilirubins remained below threshold.  FEN/GI:  Initially NPO on TPN.  OG feedings started on DOL # 1 and advanced as tolerated.  Off supplemental IV fluids on DOL # 1  At discharge, the baby is feeding Neosure 22kcal ad gurjit, taking 55-60 with each feed.  Development: NRE score of 8. EI not recommended. Will follow up with development in 6 months.

## 2023-07-21 NOTE — OB PROVIDER DELIVERY SUMMARY - NSPROVIDERDELIVERYNOTE_OBGYN_ALL_OB_FT
42yo  presenting with FGR. admitted for rCS +BS.   Patient was taken to the OR, a repeat low transverse  section was performed and a viable male infant was delivered atraumatically in cephalic presentation at 1419. No nuchal cord. Placenta delivered at 1420. Hysterotomy repaired. Bilateral salpingectomy performed. Rectus muscles, fascia, subcutaneous and skin were reapproximated with suture. Excellent hemostasis was obtained at each layer of closure.  Apgars 9/9  EBL 281cc  Placenta and b/l fallopian tubes sent to pathology  Dictation [id number] 42yo  presenting with FGR. admitted for rCS +BS.   Uterus has a 10cm myoma on left lateral side  Patient was taken to the OR, a repeat low transverse  section was performed and a viable male infant was delivered atraumatically in cephalic presentation at 1419. No nuchal cord. Placenta delivered at 1420. Hysterotomy repaired. Bilateral salpingectomy performed. Rectus muscles, fascia, subcutaneous and skin were reapproximated with suture. Excellent hemostasis was obtained at each layer of closure.  Apgars 9/9  EBL 281cc  Placenta and b/l fallopian tubes sent to pathology  Dictation [id number]

## 2023-07-21 NOTE — OB PROVIDER H&P - NSHPPHYSICALEXAM_GEN_ALL_CORE
OBJECTIVE:  T(C): 36.8 (07-21-23 @ 11:05), Max: 36.8 (07-21-23 @ 10:31)  HR: 82 (07-21-23 @ 11:05) (82 - 82)  BP: 117/74 (07-21-23 @ 11:05) (117/74 - 117/74)  RR: 18 (07-21-23 @ 11:05) (18 - 18)  SpO2: --  Physical Exam:  Gen: NAD, well-appearing, AAOx3   Abd: Soft, gravid  Ext: non-tender, non-edematous  SVE: Deferred    Bedside sono: pending  FHT:   [place]: OBJECTIVE:  T(C): 36.8 (07-21-23 @ 11:05), Max: 36.8 (07-21-23 @ 10:31)  HR: 82 (07-21-23 @ 11:05) (82 - 82)  BP: 117/74 (07-21-23 @ 11:05) (117/74 - 117/74)  RR: 18 (07-21-23 @ 11:05) (18 - 18)    Physical Exam:  Gen: NAD, well-appearing, AAOx3   Abd: Soft, gravid  Ext: non-tender, non-edematous  SVE: Deferred    Cat I Tracing

## 2023-07-22 LAB
BASOPHILS # BLD AUTO: 0.02 K/UL — SIGNIFICANT CHANGE UP (ref 0–0.2)
BASOPHILS NFR BLD AUTO: 0.2 % — SIGNIFICANT CHANGE UP (ref 0–2)
EOSINOPHIL # BLD AUTO: 0.02 K/UL — SIGNIFICANT CHANGE UP (ref 0–0.5)
EOSINOPHIL NFR BLD AUTO: 0.2 % — SIGNIFICANT CHANGE UP (ref 0–6)
HCT VFR BLD CALC: 33.1 % — LOW (ref 34.5–45)
HGB BLD-MCNC: 11.5 G/DL — SIGNIFICANT CHANGE UP (ref 11.5–15.5)
IMM GRANULOCYTES NFR BLD AUTO: 0.4 % — SIGNIFICANT CHANGE UP (ref 0–0.9)
LYMPHOCYTES # BLD AUTO: 2.52 K/UL — SIGNIFICANT CHANGE UP (ref 1–3.3)
LYMPHOCYTES # BLD AUTO: 25 % — SIGNIFICANT CHANGE UP (ref 13–44)
MCHC RBC-ENTMCNC: 31.9 PG — SIGNIFICANT CHANGE UP (ref 27–34)
MCHC RBC-ENTMCNC: 34.7 GM/DL — SIGNIFICANT CHANGE UP (ref 32–36)
MCV RBC AUTO: 91.9 FL — SIGNIFICANT CHANGE UP (ref 80–100)
MONOCYTES # BLD AUTO: 0.76 K/UL — SIGNIFICANT CHANGE UP (ref 0–0.9)
MONOCYTES NFR BLD AUTO: 7.6 % — SIGNIFICANT CHANGE UP (ref 2–14)
NEUTROPHILS # BLD AUTO: 6.7 K/UL — SIGNIFICANT CHANGE UP (ref 1.8–7.4)
NEUTROPHILS NFR BLD AUTO: 66.6 % — SIGNIFICANT CHANGE UP (ref 43–77)
PLATELET # BLD AUTO: 253 K/UL — SIGNIFICANT CHANGE UP (ref 150–400)
RBC # BLD: 3.6 M/UL — LOW (ref 3.8–5.2)
RBC # FLD: 12.5 % — SIGNIFICANT CHANGE UP (ref 10.3–14.5)
T PALLIDUM AB TITR SER: NEGATIVE — SIGNIFICANT CHANGE UP
WBC # BLD: 10.06 K/UL — SIGNIFICANT CHANGE UP (ref 3.8–10.5)
WBC # FLD AUTO: 10.06 K/UL — SIGNIFICANT CHANGE UP (ref 3.8–10.5)

## 2023-07-22 RX ORDER — IBUPROFEN 200 MG
600 TABLET ORAL EVERY 6 HOURS
Refills: 0 | Status: DISCONTINUED | OUTPATIENT
Start: 2023-07-22 | End: 2023-07-23

## 2023-07-22 RX ADMIN — SCOPALAMINE 1 PATCH: 1 PATCH, EXTENDED RELEASE TRANSDERMAL at 08:00

## 2023-07-22 RX ADMIN — Medication 975 MILLIGRAM(S): at 15:00

## 2023-07-22 RX ADMIN — Medication 975 MILLIGRAM(S): at 02:55

## 2023-07-22 RX ADMIN — Medication 600 MILLIGRAM(S): at 23:19

## 2023-07-22 RX ADMIN — Medication 30 MILLIGRAM(S): at 05:03

## 2023-07-22 RX ADMIN — ENOXAPARIN SODIUM 40 MILLIGRAM(S): 100 INJECTION SUBCUTANEOUS at 02:52

## 2023-07-22 RX ADMIN — Medication 975 MILLIGRAM(S): at 02:52

## 2023-07-22 RX ADMIN — Medication 975 MILLIGRAM(S): at 18:00

## 2023-07-22 RX ADMIN — Medication 30 MILLIGRAM(S): at 05:02

## 2023-07-22 RX ADMIN — Medication 975 MILLIGRAM(S): at 20:06

## 2023-07-22 RX ADMIN — SCOPALAMINE 1 PATCH: 1 PATCH, EXTENDED RELEASE TRANSDERMAL at 07:40

## 2023-07-22 RX ADMIN — Medication 975 MILLIGRAM(S): at 20:05

## 2023-07-22 NOTE — PROGRESS NOTE ADULT - ASSESSMENT
A/P:  44yo  now POD#1 s/p repeat  section and bilateral salpingectomy at 37w2d weeks gestation secondary to severe FGR and GDMA2.   -Vital Signs Stable  -tolerating PO, bowel function nml   - awaiting passive TOV  -Advance care as tolerated   -Continue routine postpartum/postoperative care and education.  -Ambulation, DVT prophylaxis w/lovenox  -Healthy male infant, desires circumcision.  -Dispo: continue inpatient managemtn

## 2023-07-22 NOTE — PROGRESS NOTE ADULT - ATTENDING COMMENTS
post  / BS day # 1  hx of GDM  no complaints  exam wnl  labs reviewed    plan: cbc, bladder scan and replacement of españa if needed  discussed vaccination, breastfeeding

## 2023-07-22 NOTE — PROGRESS NOTE ADULT - SUBJECTIVE AND OBJECTIVE BOX
GEOFF DENNIS is a 44yo  now POD#1 s/p repeat  section and bilateral salpingectomy at 37w2d weeks gestation secondary to severe FGR and GDMA2.     S:    The patient has no complaints. Pain controlled with medication   She is ambulating without difficulty and tolerating PO.   + flatus/-BM/- voiding   Patient denies headache, chest pain, SOB, and leg pain.   She reports her españa was taken out around midnight and hasn't urinated yet.   She reports an urge to urinate. Denies suprapubic pain at this time.     O:    T(C): 36.9 (23 @ 06:32), Max: 36.9 (23 @ 06:32)  HR: 65 (23 @ :32) (65 - 99)  BP: 100/65 (23 @ 06:32) (97/55 - 144/82)  RR: 18 (23 @ 06:32) (16 - 20)  SpO2: 100% (23 @ 06:32) (94% - 100%)      Gen: NAD, AOx3  CV: RRR  Pulm: CTAB  Breast: nontender, not engorged   Abdomen:  soft, non-tender, non-distended, +bowel sounds.  Uterus:  Fundus firm below umbilicus  Incision:  Clean/dry/intact with steri-strips in place  VE:  +lochia  Ext:  Non-tender, no edema                           11.5   10.06 )-----------( 253      ( 2023 05:11 )             33.1         135  |  103  |  7.5<L>  ----------------------------<  79  3.9   |  18.0<L>  |  0.42<L>    Ca    8.4      2023 11:03    TPro  6.3<L>  /  Alb  3.3  /  TBili  0.3<L>  /  DBili  x   /  AST  13  /  ALT  8   /  AlkPhos  112

## 2023-07-23 VITALS
TEMPERATURE: 99 F | RESPIRATION RATE: 17 BRPM | HEART RATE: 75 BPM | SYSTOLIC BLOOD PRESSURE: 99 MMHG | DIASTOLIC BLOOD PRESSURE: 63 MMHG | OXYGEN SATURATION: 99 %

## 2023-07-23 PROCEDURE — 88307 TISSUE EXAM BY PATHOLOGIST: CPT

## 2023-07-23 PROCEDURE — 86901 BLOOD TYPING SEROLOGIC RH(D): CPT

## 2023-07-23 PROCEDURE — 86900 BLOOD TYPING SEROLOGIC ABO: CPT

## 2023-07-23 PROCEDURE — 88302 TISSUE EXAM BY PATHOLOGIST: CPT

## 2023-07-23 PROCEDURE — 36415 COLL VENOUS BLD VENIPUNCTURE: CPT

## 2023-07-23 PROCEDURE — 82962 GLUCOSE BLOOD TEST: CPT

## 2023-07-23 PROCEDURE — 80053 COMPREHEN METABOLIC PANEL: CPT

## 2023-07-23 PROCEDURE — 85025 COMPLETE CBC W/AUTO DIFF WBC: CPT

## 2023-07-23 PROCEDURE — 59025 FETAL NON-STRESS TEST: CPT

## 2023-07-23 PROCEDURE — 86850 RBC ANTIBODY SCREEN: CPT

## 2023-07-23 PROCEDURE — 86780 TREPONEMA PALLIDUM: CPT

## 2023-07-23 PROCEDURE — 59050 FETAL MONITOR W/REPORT: CPT

## 2023-07-23 RX ORDER — IBUPROFEN 200 MG
1 TABLET ORAL
Qty: 0 | Refills: 0 | DISCHARGE
Start: 2023-07-23

## 2023-07-23 RX ORDER — ACETAMINOPHEN 500 MG
3 TABLET ORAL
Qty: 0 | Refills: 0 | DISCHARGE
Start: 2023-07-23

## 2023-07-23 RX ADMIN — Medication 975 MILLIGRAM(S): at 08:40

## 2023-07-23 NOTE — PROGRESS NOTE ADULT - ASSESSMENT
A/P:  42yo  now POD#2 s/p repeat  section and bilateral salpingectomy at 37w2d weeks gestation secondary to severe FGR and GDMA2. Patient has no complaints at this time and is clinically and hemodynamically stable.     -Vital Signs Stable  -tolerating PO, bowel function nml   - Passed TOV  -Advance care as tolerated   -Continue routine postpartum/postoperative care and education.  -Ambulation, DVT prophylaxis w/lovenox  -Healthy male infant, desires circumcision.  -Dispo: Home today pending attending approval and baby's circumcision

## 2023-07-23 NOTE — PROGRESS NOTE ADULT - SUBJECTIVE AND OBJECTIVE BOX
GEOFF DENNIS is a 44yo  now POD#2 s/p repeat  section and bilateral salpingectomy at 37w2d weeks gestation secondary to severe FGR and GDMA2.     S:    The patient has no complaints. Pain controlled with medication   She is ambulating without difficulty and tolerating PO.   + flatus/-BM/voiding spontaneously  Patient denies headache, chest pain, SOB, and leg pain.     O:    Vital Signs Last 24 Hrs  T(C): 37.1 (2023 04:08), Max: 37.1 (2023 11:44)  T(F): 98.8 (2023 04:08), Max: 98.8 (2023 04:08)  HR: 75 (2023 04:08) (65 - 77)  BP: 99/63 (2023 04:08) (95/55 - 107/57)    Gen: NAD, AOx3  CV: RRR  Pulm: CTAB  Breast: nontender, not engorged   Abdomen:  soft, non-tender, non-distended, +bowel sounds.  Uterus:  Fundus firm below umbilicus  Incision:  Clean/dry/intact with steri-strips in place  VE:  +lochia  Ext:  Non-tender, no edema                           11.5   10.06 )-----------( 253      ( 2023 05:11 )             33.1     -    135  |  103  |  7.5<L>  ----------------------------<  79  3.9   |  18.0<L>  |  0.42<L>    Ca    8.4      2023 11:03    TPro  6.3<L>  /  Alb  3.3  /  TBili  0.3<L>  /  DBili  x   /  AST  13  /  ALT  8   /  AlkPhos  112

## 2023-07-23 NOTE — PROGRESS NOTE ADULT - ATTENDING COMMENTS
post  day # 2  hx of gdm  no complaints  exam wnl  labs reviewed    cleared for dc  discussed contraception, vaccination, breastfeeding  follow up for post partum visit

## 2023-07-24 ENCOUNTER — APPOINTMENT (OUTPATIENT)
Dept: ANTEPARTUM | Facility: CLINIC | Age: 43
End: 2023-07-24

## 2023-07-27 ENCOUNTER — APPOINTMENT (OUTPATIENT)
Dept: ANTEPARTUM | Facility: CLINIC | Age: 43
End: 2023-07-27

## 2023-07-27 ENCOUNTER — APPOINTMENT (OUTPATIENT)
Dept: MATERNAL FETAL MEDICINE | Facility: CLINIC | Age: 43
End: 2023-07-27

## 2023-07-28 NOTE — CHART NOTE - NSCHARTNOTESELECT_GEN_ALL_CORE
Nutrition Services Arava Counseling:  Patient counseled regarding adverse effects of Arava including but not limited to nausea, vomiting, abnormalities in liver function tests. Patients may develop mouth sores, rash, diarrhea, and abnormalities in blood counts. The patient understands that monitoring is required including LFTs and blood counts.  There is a rare possibility of scarring of the liver and lung problems that can occur when taking methotrexate. Persistent nausea, loss of appetite, pale stools, dark urine, cough, and shortness of breath should be reported immediately. Patient advised to discontinue Arava treatment and consult with a physician prior to attempting conception. The patient will have to undergo a treatment to eliminate Arava from the body prior to conception.

## 2023-07-28 NOTE — CHART NOTE - NSCHARTNOTEFT_GEN_A_CORE
[facility] [employer] [facility] Note:    Initial Screening    Date of Initial Screenin23    Patient qualifies for [facility]  [ X ] Patient qualifies for [employer] [facility] w/ health condition  [  ] Patient does not qualify for [employer] [facility] w/ no health conditions    Diagnosis that qualifies patient for program  [  ] Hypertension  [  ] Diabetes  [  ] Unintended weight loss  [  ] Obesity  [  ] CHF  [ X ] Other    Additional Comments:Postpartum          Current Patient Diet: Regular Diet      Actions Taken:  [  ] Spoke with patient  [ x ] [employer] survey completed  Additional Comments: Provided with community resources through [employer], list of local food pantries, and WIC application.          Non-qualification for [employer] [facility]  [   ] D/C to [facility]  [   ] Referred to Social Work  [  ] Declined [employer] [facility]  [   ] D/C Before Seen By RD  Participant Phone Number:  KB Comments:              Discharge Visit  [  ] Initial Screening already completed    Date of D/C:  [ x ]  Patient provided with healthy groceries  [ x ] Follow Up appointment made  [ x ] Other community outreach given  [ x ] Help with SNAP/WIC application at F/U appointment  [  ] Patient D/C while RD was unavailable. Will call to schedule pick-up

## 2023-07-31 ENCOUNTER — APPOINTMENT (OUTPATIENT)
Dept: ANTEPARTUM | Facility: CLINIC | Age: 43
End: 2023-07-31

## 2023-08-03 ENCOUNTER — APPOINTMENT (OUTPATIENT)
Dept: ANTEPARTUM | Facility: CLINIC | Age: 43
End: 2023-08-03

## 2023-08-07 ENCOUNTER — APPOINTMENT (OUTPATIENT)
Dept: ANTEPARTUM | Facility: CLINIC | Age: 43
End: 2023-08-07

## 2023-10-03 LAB — SURGICAL PATHOLOGY STUDY: SIGNIFICANT CHANGE UP

## 2024-06-12 NOTE — OB PROVIDER DELIVERY SUMMARY - NSMODPPHRISK_OBGYN_A_OB
ER evaluation revealed normal CBC and chemistries.  Lactic acid and procalcitonin, inflammatory markers were normal.  Respiratory PCR panel was completely negative, including testing negative for COVID-19 and influenza.  CT of the brain without contrast was normal.  Urinalysis did reveal 21-50 white blood cells and moderate leukocytes.  Urine culture is in process.  Rx for Ceftin 500 mg by mouth twice daily x 7 days.  Recommend close PCP follow-up for recheck on urine culture results.  Increase water intake.  Continue with all other current medical management.  Contact routine urologist, Dr. Jeffries, tomorrow for first available recheck.  It is reassuring that patient had recent normal MRI of the brain and orbits with and without contrast.  Neurologist also recommended setting up EEG to rule out any seizure activity.  Patient needs to have this study performed as recommended.  Return to the ER if worsening symptoms.   Prior  birth(s) or uterine surgery

## 2025-03-31 NOTE — OB PROVIDER TRIAGE NOTE - NS_FINALEDD_OBGYN_ALL_OB_DT
Goal Outcome Evaluation:           Progress: declining  Outcome Evaluation: Pt resting well throughout shift. PRN dulcolax suppository given with medium BM afterwards. Pt seems to be becoming more labored throughout the shift and using abdominal muscles. PRN dilaudid given x3 to this time stamp on top of 2 scheduled doses of dilaudid, PRN haldol given x1, and PRN versed x1. No acute concerns at this time. Will continue to provide comfort care to pt.                              09-Aug-2023

## 2025-06-24 NOTE — ED STATDOCS - CHPI ED SYMPTOM NEG
-History noted.  -Pt on celebrex and PRN voltaren and skelaxin at home.  -Pain management as above.   vaginal bleeding/no diarrhea/no vomiting